# Patient Record
Sex: MALE | Race: WHITE | NOT HISPANIC OR LATINO | Employment: FULL TIME | URBAN - METROPOLITAN AREA
[De-identification: names, ages, dates, MRNs, and addresses within clinical notes are randomized per-mention and may not be internally consistent; named-entity substitution may affect disease eponyms.]

---

## 2022-02-21 PROBLEM — S46.212D BICEPS RUPTURE, DISTAL, LEFT, SUBSEQUENT ENCOUNTER: Status: ACTIVE | Noted: 2022-02-21

## 2022-03-04 ENCOUNTER — TELEPHONE (OUTPATIENT)
Dept: ORTHOPEDIC SURGERY | Facility: CLINIC | Age: 47
End: 2022-03-04

## 2022-03-04 NOTE — TELEPHONE ENCOUNTER
Caller: MARY FORDE    Relationship to patient: WIFE    Best call back number:     Patient is needing: THE PATIENT WILL NEED AN Sami  FOR HIS POST-OP APPOINTMENT ON 3/10/22 AT 1:20

## 2022-06-23 ENCOUNTER — TELEPHONE (OUTPATIENT)
Dept: PHYSICAL THERAPY | Facility: OTHER | Age: 47
End: 2022-06-23

## 2023-08-08 ENCOUNTER — TELEPHONE (OUTPATIENT)
Dept: FAMILY MEDICINE CLINIC | Facility: CLINIC | Age: 48
End: 2023-08-08

## 2023-08-08 ENCOUNTER — TELEPHONE (OUTPATIENT)
Dept: ORTHOPEDIC SURGERY | Facility: CLINIC | Age: 48
End: 2023-08-08

## 2023-08-08 NOTE — TELEPHONE ENCOUNTER
Provider: DR HAND  Caller:  RADIOLOGY    Reason for Call: PATIENTS ANESTHESIA SCAN  WAS CX DUE TO ABNORMAL EKG.PATIENT CAN NOT PROCEED UNTIL THEY GET PROPER CLEARANCE FROM THEIR PRIMARY CARE.     ELADIO CRAFT IS THE NURSE [facility] THAT DONE THE TESTING   HER PHONE NUMBER [phone]    PLEASE ADVISE

## 2023-08-08 NOTE — TELEPHONE ENCOUNTER
ELADIO CALLED ABOUT PT HAVING AN ABNORMAL EKG AND WANTS PATIENT TO BE SEEN. HE'S EKG IS IN EPIC WITH [facility] HE NEEDS CLEARANCE FROM [facility] BEFORE HE CAN HAVE HIS MRI UNDER ANESTHESIA.

## 2023-09-06 ENCOUNTER — HOSPITAL ENCOUNTER (OUTPATIENT)
Dept: CARDIOLOGY | Facility: HOSPITAL | Age: 48
Discharge: HOME | End: 2023-09-06
Admitting: NURSE PRACTITIONER
Payer: MEDICAID

## 2023-09-06 VITALS
SYSTOLIC BLOOD PRESSURE: 140 MMHG | DIASTOLIC BLOOD PRESSURE: 78 MMHG | WEIGHT: 242.51 LBS | HEART RATE: 82 BPM | BODY MASS INDEX: 32.85 KG/M2 | HEIGHT: 72 IN

## 2023-09-06 DIAGNOSIS — I47.29 VENTRICULAR TACHYCARDIA (PAROXYSMAL): ICD-10-CM

## 2023-09-06 DIAGNOSIS — R94.31 ABNORMAL EKG: ICD-10-CM

## 2023-09-06 DIAGNOSIS — I50.21 ACUTE HFREF (HEART FAILURE WITH REDUCED EJECTION FRACTION): ICD-10-CM

## 2023-09-06 LAB
BH CV NUCLEAR PRIOR STUDY: 2
BH CV REST NUCLEAR ISOTOPE DOSE: 9.9 MCI
BH CV STRESS BP STAGE 1: NORMAL
BH CV STRESS BP STAGE 2: NORMAL
BH CV STRESS DURATION MIN STAGE 1: 3
BH CV STRESS DURATION MIN STAGE 2: 3
BH CV STRESS DURATION MIN STAGE 3: 3
BH CV STRESS DURATION MIN STAGE 4: 1
BH CV STRESS DURATION SEC STAGE 1: 0
BH CV STRESS DURATION SEC STAGE 2: 0
BH CV STRESS DURATION SEC STAGE 3: 0
BH CV STRESS DURATION SEC STAGE 4: 0
BH CV STRESS GRADE STAGE 1: 10
BH CV STRESS GRADE STAGE 2: 12
BH CV STRESS GRADE STAGE 3: 14
BH CV STRESS GRADE STAGE 4: 16
BH CV STRESS HR STAGE 1: 139
BH CV STRESS HR STAGE 2: 155
BH CV STRESS HR STAGE 3: 171
BH CV STRESS HR STAGE 4: 184
BH CV STRESS METS STAGE 1: 5
BH CV STRESS METS STAGE 2: 7.5
BH CV STRESS METS STAGE 3: 10
BH CV STRESS METS STAGE 4: 13.5
BH CV STRESS NUCLEAR ISOTOPE DOSE: 32.1 MCI
BH CV STRESS O2 STAGE 1: 98
BH CV STRESS O2 STAGE 2: 99
BH CV STRESS O2 STAGE 3: 98
BH CV STRESS O2 STAGE 4: 98
BH CV STRESS PROTOCOL 1: NORMAL
BH CV STRESS RECOVERY BP: NORMAL MMHG
BH CV STRESS RECOVERY HR: 121 BPM
BH CV STRESS RECOVERY O2: 99 %
BH CV STRESS SPEED STAGE 1: 1.7
BH CV STRESS SPEED STAGE 2: 2.5
BH CV STRESS SPEED STAGE 3: 3.4
BH CV STRESS SPEED STAGE 4: 4.2
BH CV STRESS STAGE 1: 1
BH CV STRESS STAGE 2: 2
BH CV STRESS STAGE 3: 3
BH CV STRESS STAGE 4: 4
LV EF NUC BP: 29 %
MAXIMAL PREDICTED HEART RATE: 173 BPM
PERCENT MAX PREDICTED HR: 106.36 %
STRESS BASELINE BP: NORMAL MMHG
STRESS BASELINE HR: 82 BPM
STRESS O2 SAT REST: 98 %
STRESS PERCENT HR: 125 %
STRESS POST ESTIMATED WORKLOAD: 11.9 METS
STRESS POST EXERCISE DUR MIN: 10 MIN
STRESS POST EXERCISE DUR SEC: 0 SEC
STRESS POST O2 SAT PEAK: 98 %
STRESS POST PEAK BP: NORMAL MMHG
STRESS POST PEAK HR: 184 BPM
STRESS TARGET HR: 147 BPM

## 2023-09-06 PROCEDURE — A9500 TC99M SESTAMIBI: HCPCS | Performed by: NURSE PRACTITIONER

## 2023-09-06 PROCEDURE — 78452 HT MUSCLE IMAGE SPECT MULT: CPT

## 2023-09-06 PROCEDURE — 93017 CV STRESS TEST TRACING ONLY: CPT

## 2023-09-06 PROCEDURE — 0 TECHNETIUM SESTAMIBI: Performed by: NURSE PRACTITIONER

## 2023-09-06 RX ADMIN — TECHNETIUM TC 99M SESTAMIBI 1 DOSE: 1 INJECTION INTRAVENOUS at 14:50

## 2023-09-06 RX ADMIN — TECHNETIUM TC 99M SESTAMIBI 1 DOSE: 1 INJECTION INTRAVENOUS at 12:20

## 2023-09-14 ENCOUNTER — OFFICE VISIT (OUTPATIENT)
Dept: CARDIOLOGY | Facility: CLINIC | Age: 48
End: 2023-09-14
Payer: MEDICAID

## 2023-09-14 VITALS
OXYGEN SATURATION: 99 % | SYSTOLIC BLOOD PRESSURE: 118 MMHG | HEIGHT: 72 IN | HEART RATE: 43 BPM | BODY MASS INDEX: 32.29 KG/M2 | WEIGHT: 238.4 LBS | DIASTOLIC BLOOD PRESSURE: 64 MMHG

## 2023-09-14 DIAGNOSIS — I49.3 PVC (PREMATURE VENTRICULAR CONTRACTION): Primary | Chronic | ICD-10-CM

## 2023-09-14 DIAGNOSIS — I50.22 CHRONIC SYSTOLIC CONGESTIVE HEART FAILURE: ICD-10-CM

## 2023-09-14 DIAGNOSIS — E78.1 HYPERTRIGLYCERIDEMIA: Chronic | ICD-10-CM

## 2023-09-14 DIAGNOSIS — I42.8 OTHER CARDIOMYOPATHY: Chronic | ICD-10-CM

## 2023-09-14 NOTE — PROGRESS NOTES
New Patient     Name: Carlos Pearson    : 1975     MRN: [id number]     DOS: 2023  Referred By: Marion Blunt APRN    Chief Complaint  Ventricular tachycardia (paroxysmal)    Subjective     History of Present Illness:  Carlos Pearson is a 48 y.o. male who presents today for evaluation of cardiomyopathy and PVC/NSVT.    Patient is Malay speaking.  Following was done with an in person .    Patient presents to follow-up on testing.  He currently feels well.  He has no shortness of breath at rest or with exertion.  He has no chest pain.  He has no palpitations.  He has no orthopnea or PND.  He sleeps well throughout the night.  He works doing manual labor and factory with boxes and has no limitations.  He has been prescribed Entresto and metoprolol but has not yet started these due to issues with the pharmacy.  He is taking his fenofibrate as prescribed.  Feels like his weight is stable.  Good appetite.    Is a former smoker, having smoked [place] [facility] but has not smoked since being [place].  No alcohol.  No family history of cardiac disease.      Objective     Past Medical History:   Diagnosis Date    Abnormal ECG     Snoring     Ventricular tachycardia (paroxysmal)      Past Surgical History:   Procedure Laterality Date    BICEPS TENDON REPAIR Left 2022    Procedure: BICEPS TENDON REPAIR LEFT;  Surgeon: Noah Herron MD;  Location: [facility];  Service: Orthopedics;  Laterality: Left;     Family History   Problem Relation Age of Onset    Asthma Father      Social History     Socioeconomic History    Marital status:    Tobacco Use    Smoking status: Never     Passive exposure: Never    Smokeless tobacco: Never   Vaping Use    Vaping Use: Never used   Substance and Sexual Activity    Alcohol use: No    Drug use: No    Sexual activity: Defer     Current Outpatient Medications on File Prior to Visit   Medication Sig Dispense Refill     "acetaminophen (TYLENOL) 500 MG tablet Take 1 tablet by mouth Every 6 (Six) Hours As Needed for Mild Pain.      cyclobenzaprine (FLEXERIL) 10 MG tablet Take 1 tablet by mouth 3 (Three) Times a Day As Needed.      fenofibrate (TRICOR) 145 MG tablet Take 1 tablet by mouth Daily. 30 tablet 5    ibuprofen (ADVIL,MOTRIN) 200 MG tablet Take 1 tablet by mouth Every 6 (Six) Hours As Needed for Mild Pain.      metoprolol succinate XL (TOPROL-XL) 50 MG 24 hr tablet Take 1 tablet by mouth Daily. 30 tablet 3    sacubitril-valsartan (Entresto) 24-26 MG tablet Take 1 tablet by mouth 2 (Two) Times a Day. 60 tablet 3    [DISCONTINUED] diclofenac (VOLTAREN) 75 MG EC tablet Take 1 tablet by mouth 2 (Two) Times a Day. (Patient not taking: Reported on 7/28/2023)      [DISCONTINUED] PEG-KCl-NaCl-NaSulf-Na Asc-C (MoviPrep) 100 g reconstituted solution powder Use as directed by Provider for colonoscopy prep (Patient not taking: Reported on 8/30/2023) 1 each 0     No current facility-administered medications on file prior to visit.         Vital Signs  /64 (BP Location: Right arm, Patient Position: Sitting, Cuff Size: Adult)   Pulse (!) 43 Comment: retook...58  Ht 182.9 cm (72\")   Wt 108 kg (238 lb 6.4 oz)   SpO2 99%   BMI 32.33 kg/m²   Estimated body mass index is 32.33 kg/m² as calculated from the following:    Height as of this encounter: 182.9 cm (72\").    Weight as of this encounter: 108 kg (238 lb 6.4 oz).    Vitals and nursing note reviewed.   Constitutional:       Appearance: Healthy appearance. Not in distress.   Eyes:      Conjunctiva/sclera: Conjunctivae normal.   HENT:    Mouth/Throat:      Dentition: Normal.      Pharynx: Oropharynx is clear.   Neck:      Vascular: JVD normal.   Pulmonary:      Effort: Pulmonary effort is normal.      Breath sounds: Normal breath sounds.   Cardiovascular:      PMI at left midclavicular line. Normal rate. Frequent ectopic beats. Regular rhythm.      Murmurs: There is no murmur.      " No gallop.  No click. No rub.   Pulses:     Intact distal pulses.   Edema:     Peripheral edema absent.   Musculoskeletal: Normal range of motion.      Cervical back: Normal range of motion and neck supple. Skin:     General: Skin is warm and dry.   Neurological:      Mental Status: Alert and oriented to person, place and time.       Results (if applicable):  Lab Results   Component Value Date    CHOL 355 (H) 07/07/2023    CHLPL 257 (H) 06/11/2021    TRIG 2,075 (H) 07/07/2023    HDL 20 (L) 07/07/2023    LDL  07/07/2023      Comment:      Unable to calculate    LDL 29 07/07/2023     Lab Results   Component Value Date    GLUCOSE 102 (H) 08/09/2023    CALCIUM 9.7 08/09/2023     08/09/2023    K 3.9 08/09/2023    CO2 27.2 08/09/2023     08/09/2023    BUN 20 08/09/2023    CREATININE 1.20 08/09/2023    EGFR 75.1 08/09/2023    BCR 16.7 08/09/2023    ANIONGAP 9.8 08/09/2023     Lab Results   Component Value Date    WBC 6.93 07/07/2023    HGB 16.6 07/07/2023    HCT 43.8 07/07/2023    MCV 86.1 07/07/2023     07/07/2023      Lab Results   Component Value Date    HGBA1C 5.40 07/07/2023       Echocardiogram (8/18/2023)      Estimated left ventricular EF = 40%    The left ventricular cavity is borderline dilated.    The cardiac valves are anatomically and functionally normal.     Nuclear treadmill stress (9/6/2023)      Treadmill nuclear stress test for VT and systolic heart failure    The patient exercised a total of 10 minutes and 50 seconds, achieving 11.9 METs    Frequent PVCs noted at rest. These were suppresed during stress and returned in the recovery phase.    No ECG evidence of ischemia    There is apical thinning present but no perfusion deficit noted at rest or with stress    Left ventricular ejection fraction is severely reduced (Calculated EF = 29%).    There is no prior study available for comparison.    No CT evidence of coronary or aortic calcifications.      Assessment and Plan     Carlos kaur  Michel is a 48 y.o. male who presents today for the aforementioned problems.     Diagnoses and all orders for this visit:    1. PVC (premature ventricular contraction) (Primary)    2. Chronic systolic congestive heart failure    3. Hypertriglyceridemia        This is a pleasant 48-year-old gentleman who presents today with an .  Patient was found to have bradycardia incidentally during a visit to  for an MRI.  He then presented to our heart valve clinic where he was found to have frequent PVCs.  Subsequent echocardiogram showed an ejection fraction of 40%, he had a nuclear stress treadmill which failed to show any perfusion deficits but did show frequent PVCs that were suppressed with exertion.  He has been prescribed metoprolol succinate 50 mg daily as well as Entresto 24/26 was not started these due to issues with the pharmacy.  He will go from our clinic to the pharmacy to pick these up.  On exam, he is euvolemic and has no symptoms of heart failure currently.  He has no functional limitations from heart failure.  After starting his medications, we will obtain an echocardiogram in 3 months.  If no improvement in his EF, or if there is worsening, plan to refer him to EP for consideration of a PVC ablation.  We are also awaiting the results of his monitor. He has a profound hypertriglyceridemia for which he was recently started on fenofibrate, this will need to be monitored closely.     Summary  Medication changes: None (hasn't yet started metoprolol or Entresto)  Diagnostic studies ordered: Echocardiogram in 3 months  RTC 3 months    Pavel Baez MD  Interventional Cardiology  [facility]

## 2023-09-14 NOTE — PATIENT INSTRUCTIONS
It was a pleasure seeing you in clinic today.     Medication changes: Start the metoprolol and Entresto    Blood work ordered: None    Diagnostic studies ordered: Repeat ultrasound of heart in 3 months

## 2023-12-11 RX ORDER — METOPROLOL SUCCINATE 50 MG/1
50 TABLET, EXTENDED RELEASE ORAL DAILY
Qty: 90 TABLET | Refills: 3 | Status: SHIPPED | OUTPATIENT
Start: 2023-12-11

## 2023-12-11 RX ORDER — METOPROLOL SUCCINATE 50 MG/1
50 TABLET, EXTENDED RELEASE ORAL DAILY
Qty: 90 TABLET | Refills: 1 | OUTPATIENT
Start: 2023-12-11

## 2023-12-14 ENCOUNTER — HOSPITAL ENCOUNTER (OUTPATIENT)
Dept: CARDIOLOGY | Facility: HOSPITAL | Age: 48
Discharge: HOME | End: 2023-12-14
Payer: MEDICAID

## 2023-12-14 VITALS — BODY MASS INDEX: 32.23 KG/M2 | HEIGHT: 72 IN | WEIGHT: 238 LBS

## 2023-12-14 DIAGNOSIS — I42.8 OTHER CARDIOMYOPATHY: Chronic | ICD-10-CM

## 2023-12-14 LAB
BH CV ECHO MEAS - AO MAX PG: 8.8 MMHG
BH CV ECHO MEAS - AO MEAN PG: 5 MMHG
BH CV ECHO MEAS - AO ROOT DIAM: 3.5 CM
BH CV ECHO MEAS - AO V2 MAX: 148 CM/SEC
BH CV ECHO MEAS - AO V2 VTI: 26.3 CM
BH CV ECHO MEAS - AVA(I,D): 2.11 CM2
BH CV ECHO MEAS - EDV(CUBED): 202.2 ML
BH CV ECHO MEAS - EDV(MOD-SP2): 151 ML
BH CV ECHO MEAS - EDV(MOD-SP4): 218 ML
BH CV ECHO MEAS - EF(MOD-BP): 41 %
BH CV ECHO MEAS - EF(MOD-SP2): 35.1 %
BH CV ECHO MEAS - EF(MOD-SP4): 43.1 %
BH CV ECHO MEAS - ESV(MOD-SP2): 98 ML
BH CV ECHO MEAS - ESV(MOD-SP4): 124 ML
BH CV ECHO MEAS - IVS/LVPW: 0.97 CM
BH CV ECHO MEAS - IVSD: 1 CM
BH CV ECHO MEAS - LA DIMENSION: 4 CM
BH CV ECHO MEAS - LAT PEAK E' VEL: 17.3 CM/SEC
BH CV ECHO MEAS - LV DIASTOLIC VOL/BSA (35-75): 95 CM2
BH CV ECHO MEAS - LV MASS(C)D: 241.9 GRAMS
BH CV ECHO MEAS - LV MAX PG: 3.1 MMHG
BH CV ECHO MEAS - LV MEAN PG: 2 MMHG
BH CV ECHO MEAS - LV SYSTOLIC VOL/BSA (12-30): 54.1 CM2
BH CV ECHO MEAS - LV V1 MAX: 88.7 CM/SEC
BH CV ECHO MEAS - LV V1 VTI: 16.1 CM
BH CV ECHO MEAS - LVIDD: 5.9 CM
BH CV ECHO MEAS - LVIDS: 3.9 CM
BH CV ECHO MEAS - LVOT AREA: 3.5 CM2
BH CV ECHO MEAS - LVOT DIAM: 2.1 CM
BH CV ECHO MEAS - LVPWD: 1.03 CM
BH CV ECHO MEAS - MED PEAK E' VEL: 9.9 CM/SEC
BH CV ECHO MEAS - MV A MAX VEL: 84 CM/SEC
BH CV ECHO MEAS - MV DEC SLOPE: 595.5 CM/SEC2
BH CV ECHO MEAS - MV DEC TIME: 0.14 SEC
BH CV ECHO MEAS - MV E MAX VEL: 58.7 CM/SEC
BH CV ECHO MEAS - MV E/A: 0.7
BH CV ECHO MEAS - MV MAX PG: 2.5 MMHG
BH CV ECHO MEAS - MV MEAN PG: 1.63 MMHG
BH CV ECHO MEAS - MV P1/2T: 31.8 MSEC
BH CV ECHO MEAS - MV V2 VTI: 17.2 CM
BH CV ECHO MEAS - MVA(P1/2T): 6.9 CM2
BH CV ECHO MEAS - MVA(VTI): 3.2 CM2
BH CV ECHO MEAS - PA ACC TIME: 0.15 SEC
BH CV ECHO MEAS - RAP SYSTOLE: 3 MMHG
BH CV ECHO MEAS - RVSP: 24 MMHG
BH CV ECHO MEAS - SI(MOD-SP2): 23.1 ML/M2
BH CV ECHO MEAS - SI(MOD-SP4): 41 ML/M2
BH CV ECHO MEAS - SV(LVOT): 55.6 ML
BH CV ECHO MEAS - SV(MOD-SP2): 53 ML
BH CV ECHO MEAS - SV(MOD-SP4): 94 ML
BH CV ECHO MEAS - TAPSE (>1.6): 2.04 CM
BH CV ECHO MEAS - TR MAX PG: 20.6 MMHG
BH CV ECHO MEAS - TR MAX VEL: 226.6 CM/SEC
BH CV ECHO MEASUREMENTS AVERAGE E/E' RATIO: 4.32
BH CV VAS BP RIGHT ARM: NORMAL MMHG
BH CV XLRA - RV BASE: 4.7 CM
BH CV XLRA - RV LENGTH: 9.2 CM
BH CV XLRA - RV MID: 3.7 CM
BH CV XLRA - TDI S': 17 CM/SEC
LEFT ATRIUM VOLUME INDEX: 29.4 ML/M2

## 2023-12-14 PROCEDURE — 93306 TTE W/DOPPLER COMPLETE: CPT

## 2024-01-11 ENCOUNTER — OFFICE VISIT (OUTPATIENT)
Dept: CARDIOLOGY | Facility: CLINIC | Age: 49
End: 2024-01-11
Payer: MEDICAID

## 2024-01-11 VITALS
SYSTOLIC BLOOD PRESSURE: 120 MMHG | WEIGHT: 239.8 LBS | HEIGHT: 72 IN | HEART RATE: 96 BPM | DIASTOLIC BLOOD PRESSURE: 60 MMHG | BODY MASS INDEX: 32.48 KG/M2 | OXYGEN SATURATION: 95 %

## 2024-01-11 DIAGNOSIS — E78.1 HYPERTRIGLYCERIDEMIA: ICD-10-CM

## 2024-01-11 DIAGNOSIS — I49.3 PVC (PREMATURE VENTRICULAR CONTRACTION): Primary | Chronic | ICD-10-CM

## 2024-01-11 DIAGNOSIS — I50.22 CHRONIC SYSTOLIC CONGESTIVE HEART FAILURE: Chronic | ICD-10-CM

## 2024-01-11 RX ORDER — METOPROLOL SUCCINATE 50 MG/1
50 TABLET, EXTENDED RELEASE ORAL DAILY
Qty: 90 TABLET | Refills: 3 | Status: SHIPPED | OUTPATIENT
Start: 2024-01-11

## 2024-01-11 RX ORDER — SACUBITRIL AND VALSARTAN 24; 26 MG/1; MG/1
1 TABLET, FILM COATED ORAL 2 TIMES DAILY
Qty: 120 TABLET | Refills: 3 | Status: SHIPPED | OUTPATIENT
Start: 2024-01-11

## 2024-01-11 NOTE — PROGRESS NOTES
New Patient     Name: Frankie Alatorre Ra    : 1975     MRN: [id number]     DOS: 2024  Referred By: No ref. provider found    Chief Complaint  PVC (premature ventricular contraction)    Subjective     History of Present Illness:  Frankie Alatorre Ra is a 48 y.o. male with history of frequent PVCs and presumed nonischemic cardiomyopathy who presents in routine follow-up.  The following is obtained through an Yoruba .    Since last being seen, he has continued to do well.  He remains asymptomatic from a PVC standpoint.  He also denies any chest pain, shortness of breath at rest or with exertion, orthopnea, PND, changes in appetite or changes in weight.  He continues to exercise regularly.  He is adherent to his Entresto and his metoprolol.    Objective     Past Medical History:   Diagnosis Date    Abnormal ECG     Snoring     Ventricular tachycardia (paroxysmal)      Past Surgical History:   Procedure Laterality Date    BICEPS TENDON REPAIR Left 2022    Procedure: BICEPS TENDON REPAIR LEFT;  Surgeon: Noah Herron MD;  [facility];  Service: Orthopedics;  Laterality: Left;     Family History   Problem Relation Age of Onset    Asthma Father      Social History     Socioeconomic History    Marital status:    Tobacco Use    Smoking status: Never     Passive exposure: Never    Smokeless tobacco: Never   Vaping Use    Vaping Use: Never used   Substance and Sexual Activity    Alcohol use: No    Drug use: No    Sexual activity: Defer     Current Outpatient Medications on File Prior to Visit   Medication Sig Dispense Refill    acetaminophen (TYLENOL) 500 MG tablet Take 1 tablet by mouth Every 6 (Six) Hours As Needed for Mild Pain.      cyclobenzaprine (FLEXERIL) 10 MG tablet Take 1 tablet by mouth 3 (Three) Times a Day As Needed.      fenofibrate (TRICOR) 145 MG tablet Take 1 tablet by mouth Daily. 30 tablet 5    [DISCONTINUED] sacubitril-valsartan  "(Entresto) 24-26 MG tablet Take 1 tablet by mouth 2 (Two) Times a Day. 60 tablet 3    [DISCONTINUED] ibuprofen (ADVIL,MOTRIN) 200 MG tablet Take 1 tablet by mouth Every 6 (Six) Hours As Needed for Mild Pain. (Patient not taking: Reported on 1/11/2024)      [DISCONTINUED] metoprolol succinate XL (TOPROL-XL) 50 MG 24 hr tablet Take 1 tablet by mouth Daily. (Patient not taking: Reported on 1/11/2024) 90 tablet 3     No current facility-administered medications on file prior to visit.         Vital Signs  /60 (BP Location: Right arm, Patient Position: Sitting, Cuff Size: Adult)   Pulse 96   Ht 182.9 cm (72\")   Wt 109 kg (239 lb 12.8 oz)   SpO2 95%   BMI 32.52 kg/m²   Estimated body mass index is 32.52 kg/m² as calculated from the following:    Height as of this encounter: 182.9 cm (72\").    Weight as of this encounter: 109 kg (239 lb 12.8 oz).    Vitals and nursing note reviewed.   Constitutional:       Appearance: Healthy appearance. Not in distress.   Eyes:      Conjunctiva/sclera: Conjunctivae normal.   HENT:    Mouth/Throat:      Dentition: Normal.      Pharynx: Oropharynx is clear.   Neck:      Vascular: JVD normal.   Pulmonary:      Effort: Pulmonary effort is normal.      Breath sounds: Normal breath sounds.   Cardiovascular:      PMI at left midclavicular line. Normal rate. Frequent ectopic beats. Regular rhythm.      Murmurs: There is no murmur.      No gallop.  No click. No rub.   Pulses:     Intact distal pulses.   Edema:     Peripheral edema absent.   Musculoskeletal: Normal range of motion.      Cervical back: Normal range of motion and neck supple. Skin:     General: Skin is warm and dry.   Neurological:      Mental Status: Alert and oriented to person, place and time.         Results (if applicable):  Lab Results   Component Value Date    CHOL 355 (H) 07/07/2023    CHLPL 257 (H) 06/11/2021    TRIG 2,075 (H) 07/07/2023    HDL 20 (L) 07/07/2023    LDL  07/07/2023      Comment:      Unable to " calculate    LDL 29 07/07/2023     Lab Results   Component Value Date    GLUCOSE 102 (H) 08/09/2023    CALCIUM 9.7 08/09/2023     08/09/2023    K 3.9 08/09/2023    CO2 27.2 08/09/2023     08/09/2023    BUN 20 08/09/2023    CREATININE 1.20 08/09/2023    EGFR 75.1 08/09/2023    BCR 16.7 08/09/2023    ANIONGAP 9.8 08/09/2023     Lab Results   Component Value Date    WBC 6.93 07/07/2023    HGB 16.6 07/07/2023    HCT 43.8 07/07/2023    MCV 86.1 07/07/2023     07/07/2023      Lab Results   Component Value Date    HGBA1C 5.40 07/07/2023       Echocardiogram (8/18/2023)    Estimated left ventricular EF = 40%    The left ventricular cavity is borderline dilated.    The cardiac valves are anatomically and functionally normal.     Nuclear treadmill stress (9/6/2023)       Treadmill nuclear stress test for VT and systolic heart failure    The patient exercised a total of 10 minutes and 50 seconds, achieving 11.9 METs    Frequent PVCs noted at rest. These were suppresed during stress and returned in the recovery phase.    No ECG evidence of ischemia    There is apical thinning present but no perfusion deficit noted at rest or with stress    Left ventricular ejection fraction is severely reduced (Calculated EF = 29%).    There is no prior study available for comparison.    No CT evidence of coronary or aortic calcifications.    Echocardiogram (12/14/2023)    Left ventricular systolic function is mildly decreased. Calculated left ventricular EF of 41% with global hypokinesis    Left ventricular diastolic function is consistent with (grade Ia w/high LAP) impaired relaxation.    The right ventricle is mildly dilated with normal systolic function    No significant valvular abnormalities    No pericardial effusion    Compared to prior echocardiogram dated 8/18/2023, there is no significant change.       Procedures    Assessment and Plan     Frankie Cidmaria r Bills Celi Pang is a 48 y.o. male who presents today for the  aforementioned problems.     Diagnoses and all orders for this visit:    1. PVC (premature ventricular contraction) (Primary)  -     metoprolol succinate XL (TOPROL-XL) 50 MG 24 hr tablet; Take 1 tablet by mouth Daily.  Dispense: 90 tablet; Refill: 3  -     Ambulatory Referral to Cardiac Electrophysiology    2. Chronic systolic congestive heart failure  -     sacubitril-valsartan (Entresto) 24-26 MG tablet; Take 1 tablet by mouth 2 (Two) Times a Day.  Dispense: 120 tablet; Refill: 3  -     metoprolol succinate XL (TOPROL-XL) 50 MG 24 hr tablet; Take 1 tablet by mouth Daily.  Dispense: 90 tablet; Refill: 3  -     Ambulatory Referral to Cardiac Electrophysiology    3. Hypertriglyceridemia        This is a pleasant 48-year-old Turkmen speaking gentleman who presents today in routine follow-up.  He is currently doing well, he is free of heart failure or angina related symptoms.  He is also free of any PVC related symptoms.  However, despite GDMT with metoprolol and Entresto his EF did not improve.  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  In the interim, he will remain on metoprolol succinate 50 mg daily as well as Entresto 24/26 twice daily.  His blood pressure today in clinic is at goal.  He appears euvolemic on exam.  As before, he is taking a fibrate for his profound hypertriglyceridemia and this will need to be monitored closely.  He may benefit from Vascepa in the future.      Summary  Medication changes: None, refill of metoprolol and Entresto  Diagnostic studies ordered: Referral to EP  RTC 6 months or sooner as needed    Pavel Baez MD, [facility]  Interventional Cardiology  [facility]

## 2024-01-22 ENCOUNTER — OFFICE VISIT (OUTPATIENT)
Dept: CARDIOLOGY | Facility: CLINIC | Age: 49
End: 2024-01-22
Payer: MEDICAID

## 2024-01-22 VITALS
BODY MASS INDEX: 33.08 KG/M2 | HEIGHT: 72 IN | WEIGHT: 244.2 LBS | HEART RATE: 86 BPM | DIASTOLIC BLOOD PRESSURE: 80 MMHG | SYSTOLIC BLOOD PRESSURE: 136 MMHG | OXYGEN SATURATION: 96 %

## 2024-01-22 DIAGNOSIS — R06.83 SNORING: ICD-10-CM

## 2024-01-22 DIAGNOSIS — I49.3 PVC (PREMATURE VENTRICULAR CONTRACTION): ICD-10-CM

## 2024-01-22 DIAGNOSIS — I50.22 CHRONIC SYSTOLIC CONGESTIVE HEART FAILURE: Primary | ICD-10-CM

## 2024-01-22 DIAGNOSIS — I49.3 PVC (PREMATURE VENTRICULAR CONTRACTION): Primary | ICD-10-CM

## 2024-01-22 PROCEDURE — 99214 OFFICE O/P EST MOD 30 MIN: CPT | Performed by: INTERNAL MEDICINE

## 2024-01-22 RX ORDER — VERAPAMIL HYDROCHLORIDE 240 MG/1
240 TABLET, FILM COATED, EXTENDED RELEASE ORAL DAILY
Qty: 90 TABLET | Refills: 3 | Status: SHIPPED | OUTPATIENT
Start: 2024-01-22 | End: 2025-01-21

## 2024-01-22 NOTE — PROGRESS NOTES
Cardiac Electrophysiology Outpatient Consult Note            [facility]    Consult Note     Frankie Alatorre Ra  [id number]  01/22/2024  [phone]     Primary Care Physician: Darlin Wells PA-C    Referred By: Pavel Baez III, MD    Subjective     Chief Complaint:   Diagnoses and all orders for this visit:    1. Chronic systolic congestive heart failure (Primary)    2. PVC (premature ventricular contraction)    3. Snoring    Other orders  -     verapamil SR (CALAN-SR) 240 MG CR tablet; Take 1 tablet by mouth Daily.  Dispense: 90 tablet; Refill: 3      Chief Complaint   Patient presents with    PVC (premature ventricular contraction)     NP       History of Present Illness:   Frankie Alatorre Ra is a 48 y.o. male who presents to my electrophysiology clinic for evaluation of above complaints.  This patient communicates via .    Patient was scheduled to have an MRI of his arm.  MRI was performed with a fair bit of anxiety and fear from the patient he is quite claustrophobic.  He is will not be able to get another MRI he states.    The PVCs were documented at the time of the MRI.  This is the first time he heard about this.  He is referred to me for consideration about what to do.    No palpitations.  \  No awareness of the PVCs    No family history of sudden death     no syncope    No presyncope      Past Medical History:   Past Medical History:   Diagnosis Date    Abnormal ECG     Snoring     Ventricular tachycardia (paroxysmal)        Past Surgical History:   Past Surgical History:   Procedure Laterality Date    BICEPS TENDON REPAIR Left 02/23/2022    Procedure: BICEPS TENDON REPAIR LEFT;  Surgeon: Noah Herron MD;  Location: [facility];  Service: Orthopedics;  Laterality: Left;       Family History:   Family History   Problem Relation Age of Onset    Asthma Father        Social History:   Social History  "    Socioeconomic History    Marital status:    Tobacco Use    Smoking status: Never     Passive exposure: Never    Smokeless tobacco: Never   Vaping Use    Vaping Use: Never used   Substance and Sexual Activity    Alcohol use: No    Drug use: No    Sexual activity: Yes     Partners: Female       Medications:     Current Outpatient Medications:     acetaminophen (TYLENOL) 500 MG tablet, Take 1 tablet by mouth Every 6 (Six) Hours As Needed for Mild Pain., Disp: , Rfl:     cyclobenzaprine (FLEXERIL) 10 MG tablet, Take 1 tablet by mouth 3 (Three) Times a Day As Needed., Disp: , Rfl:     fenofibrate (TRICOR) 145 MG tablet, Take 1 tablet by mouth Daily., Disp: 30 tablet, Rfl: 5    sacubitril-valsartan (Entresto) 24-26 MG tablet, Take 1 tablet by mouth 2 (Two) Times a Day., Disp: 120 tablet, Rfl: 3    verapamil SR (CALAN-SR) 240 MG CR tablet, Take 1 tablet by mouth Daily., Disp: 90 tablet, Rfl: 3    Allergies:   No Known Allergies    Objective   Vital Signs:   Vitals:    01/22/24 1611   BP: 136/80   BP Location: Left arm   Patient Position: Sitting   Cuff Size: Adult   Pulse: 86   SpO2: 96%   Weight: 111 kg (244 lb 3.2 oz)   Height: 182.9 cm (72\")       PHYSICAL EXAM  General appearance: Awake, alert, cooperative  Head: Normocephalic, without obvious abnormality, atraumatic  Eyes: Conjunctivae/corneas clear, EOMs intact  Neck: no adenopathy, no carotid bruit, no JVD, and thyroid: not enlarged  Lungs: clear to auscultation bilaterally and no rhonchi or crackles\", ' symmetric  Heart: regular rate and rhythm, S1, S2 normal, no murmur, click, rub or gallop  Abdomen: Soft, non-tender, bowel sounds normal,  no organomegaly  Extremities: extremities normal, atraumatic, no cyanosis or edema  Skin: Skin color, turgor normal, no rashes or lesions  Neurologic: Grossly normal     Lab Results   Component Value Date    GLUCOSE 102 (H) 08/09/2023    CALCIUM 9.7 08/09/2023     08/09/2023    K 3.9 08/09/2023    CO2 27.2 " "08/09/2023     08/09/2023    BUN 20 08/09/2023    CREATININE 1.20 08/09/2023    EGFRIFAFRI >150 06/11/2021    EGFRIFNONA >150 06/11/2021    BCR 16.7 08/09/2023    ANIONGAP 9.8 08/09/2023     Lab Results   Component Value Date    WBC 6.93 07/07/2023    HGB 16.6 07/07/2023    HCT 43.8 07/07/2023    MCV 86.1 07/07/2023     07/07/2023     No results found for: \"INR\", \"PROTIME\"  Lab Results   Component Value Date    TSH 3.930 07/07/2023       Cardiac Testing:      I personally viewed and interpreted the patient's EKG/Telemetry/lab data    Procedures    Tobacco Cessation: N/A  Obstructive Sleep Apnea Screening: Completed    Advance Care Planning   ACP discussion was declined by the patient. Patient does not have an advance directive, declines further assistance.       Assessment & Plan    Diagnoses and all orders for this visit:    1. Chronic systolic congestive heart failure (Primary)    2. PVC (premature ventricular contraction)    3. Snoring    Other orders  -     verapamil SR (CALAN-SR) 240 MG CR tablet; Take 1 tablet by mouth Daily.  Dispense: 90 tablet; Refill: 3         Diagnosis Plan   1. Chronic systolic congestive heart failure  Consider primary cardiomyopathy versus PVC induced cardiomyopathy.  See discussion below.      2. PVC (premature ventricular contraction)  Patient is referred by his cardiologist for consideration of a PVC induced cardiomyopathy versus a primary cardiomyopathy.    Patient had his PVC discovered by accident.  His LV systolic function is modestly reduced at ejection fraction of 39% x 1 study and slightly less by another study.  Patient has subtle abnormality of depolarization noted in the inferior myocardial leads on his surface EKG.    His PVC is of an outflow tract morphology that is to say left bundle branch block morphology with transition in lead V3 inferior axis RS complex in lead I.     His PVC burden is 33% x 1 estimate.    He has no awareness and no symptoms of his " PVCs.    His noninvasive stress test was unremarkable except for LV systolic dysfunction.    Review personally of his echocardiogram demonstrates perhaps mild LV dilation versus normal variant but clearly modest degree of LV systolic dysfunction.    First and foremost we need to make a decision whether this patient has a PVC induced cardiomyopathy versus potentially a primary cardiomyopathy was a resultant PVC.    All of this conversation was communicated with the patient via .    We will stop his Toprol    Place him on verapamil to 40 once a day to see if we can suppress the PVC.  If we are able to suppress the PVC as reviewed by a 24-hour Holter monitor to less than 10% then this will be helpful.  If his ejection fraction substantially improves we will admit a diagnosis of a PVC cardiomyopathy if the however the ejection fraction remains significantly depressed and then likely he has a primary cardiomyopathy.    He remains to be seen at this juncture whether the patient will stand to benefit from a catheter ablation procedure however certainly this is possible.    All of this was communicated via the .  We spent about 50 minutes in communication altogether.  Everyone's questions were answered to their satisfaction.         Body mass index is 33.12 kg/m².    I spent 52 minutes in consultation with this patient which included more than 65% of this time in direct face-to-face counseling, physical examination and discussion of my assessment and findings and this shared decision making with the patient.  The remainder of the time not spent face-to-face was performing one, some or all of the following actions: preparing to see the patient (e.g. reviewing tests, prior clinicians' notes, etc), ordering medications, tests or procedures, coordination of care, discussion of the plan with other healthcare providers, documenting clinical information in epic as well as independently interpreting  results and communication of these results to the patient family and/or caregiver(s).  Please note that this explicitly excludes time spent on other separate billable services such as performing procedures or test interpretation, when applicable.    Follow Up:       Thank you for allowing me to participate in the care of your patient. Please to not hesitate to contact me with additional questions or concerns.      John Romo, DO, FACC, RS  Cardiac Electrophysiologist  [facility] / [facility]

## 2024-01-27 DIAGNOSIS — E78.2 MIXED HYPERLIPIDEMIA: ICD-10-CM

## 2024-01-27 RX ORDER — FENOFIBRATE 145 MG/1
145 TABLET, COATED ORAL DAILY
Qty: 90 TABLET | Refills: 1 | Status: SHIPPED | OUTPATIENT
Start: 2024-01-27

## 2024-02-07 ENCOUNTER — LAB (OUTPATIENT)
Dept: LAB | Facility: HOSPITAL | Age: 49
End: 2024-02-07
Payer: MEDICAID

## 2024-02-07 ENCOUNTER — OFFICE VISIT (OUTPATIENT)
Dept: FAMILY MEDICINE CLINIC | Facility: CLINIC | Age: 49
End: 2024-02-07
Payer: MEDICAID

## 2024-02-07 VITALS
BODY MASS INDEX: 32.18 KG/M2 | DIASTOLIC BLOOD PRESSURE: 58 MMHG | HEART RATE: 56 BPM | SYSTOLIC BLOOD PRESSURE: 124 MMHG | WEIGHT: 237.6 LBS | TEMPERATURE: 97.6 F | OXYGEN SATURATION: 98 % | HEIGHT: 72 IN

## 2024-02-07 DIAGNOSIS — E78.2 MIXED HYPERLIPIDEMIA: ICD-10-CM

## 2024-02-07 DIAGNOSIS — Z00.00 ENCOUNTER FOR PREVENTATIVE ADULT HEALTH CARE EXAMINATION: Primary | ICD-10-CM

## 2024-02-07 DIAGNOSIS — Z12.11 SCREEN FOR COLON CANCER: ICD-10-CM

## 2024-02-07 DIAGNOSIS — Z00.00 ENCOUNTER FOR PREVENTATIVE ADULT HEALTH CARE EXAMINATION: ICD-10-CM

## 2024-02-07 DIAGNOSIS — E66.9 OBESITY (BMI 30.0-34.9): ICD-10-CM

## 2024-02-07 LAB
ABO GROUP BLD: NORMAL
ALBUMIN SERPL-MCNC: 5.1 G/DL (ref 3.5–5.2)
ALBUMIN/GLOB SERPL: 2.3 G/DL
ALP SERPL-CCNC: 47 U/L (ref 39–117)
ALT SERPL W P-5'-P-CCNC: 58 U/L (ref 1–41)
ANION GAP SERPL CALCULATED.3IONS-SCNC: 12 MMOL/L (ref 5–15)
AST SERPL-CCNC: 36 U/L (ref 1–40)
BILIRUB SERPL-MCNC: 0.5 MG/DL (ref 0–1.2)
BUN SERPL-MCNC: 15 MG/DL (ref 6–20)
BUN/CREAT SERPL: 16 (ref 7–25)
CALCIUM SPEC-SCNC: 9.5 MG/DL (ref 8.6–10.5)
CHLORIDE SERPL-SCNC: 104 MMOL/L (ref 98–107)
CHOLEST SERPL-MCNC: 241 MG/DL (ref 0–200)
CO2 SERPL-SCNC: 24 MMOL/L (ref 22–29)
CREAT SERPL-MCNC: 0.94 MG/DL (ref 0.76–1.27)
DEPRECATED RDW RBC AUTO: 44.2 FL (ref 37–54)
EGFRCR SERPLBLD CKD-EPI 2021: 100.6 ML/MIN/1.73
ERYTHROCYTE [DISTWIDTH] IN BLOOD BY AUTOMATED COUNT: 13.1 % (ref 12.3–15.4)
GLOBULIN UR ELPH-MCNC: 2.2 GM/DL
GLUCOSE SERPL-MCNC: 83 MG/DL (ref 65–99)
HCT VFR BLD AUTO: 43.6 % (ref 37.5–51)
HDLC SERPL-MCNC: 31 MG/DL (ref 40–60)
HGB BLD-MCNC: 15.2 G/DL (ref 13–17.7)
LDLC SERPL CALC-MCNC: 101 MG/DL (ref 0–100)
LDLC/HDLC SERPL: 2.64 {RATIO}
MCH RBC QN AUTO: 31.6 PG (ref 26.6–33)
MCHC RBC AUTO-ENTMCNC: 34.9 G/DL (ref 31.5–35.7)
MCV RBC AUTO: 90.6 FL (ref 79–97)
PLATELET # BLD AUTO: 322 10*3/MM3 (ref 140–450)
PMV BLD AUTO: 9.4 FL (ref 6–12)
POTASSIUM SERPL-SCNC: 4.2 MMOL/L (ref 3.5–5.2)
PROT SERPL-MCNC: 7.3 G/DL (ref 6–8.5)
RBC # BLD AUTO: 4.81 10*6/MM3 (ref 4.14–5.8)
RH BLD: POSITIVE
SODIUM SERPL-SCNC: 140 MMOL/L (ref 136–145)
TRIGL SERPL-MCNC: 641 MG/DL (ref 0–150)
VLDLC SERPL-MCNC: 109 MG/DL (ref 5–40)
WBC NRBC COR # BLD AUTO: 8.81 10*3/MM3 (ref 3.4–10.8)

## 2024-02-07 PROCEDURE — 1160F RVW MEDS BY RX/DR IN RCRD: CPT | Performed by: PHYSICIAN ASSISTANT

## 2024-02-07 PROCEDURE — 80061 LIPID PANEL: CPT

## 2024-02-07 PROCEDURE — 36415 COLL VENOUS BLD VENIPUNCTURE: CPT

## 2024-02-07 PROCEDURE — 1159F MED LIST DOCD IN RCRD: CPT | Performed by: PHYSICIAN ASSISTANT

## 2024-02-07 PROCEDURE — 80050 GENERAL HEALTH PANEL: CPT

## 2024-02-07 PROCEDURE — 86901 BLOOD TYPING SEROLOGIC RH(D): CPT

## 2024-02-07 PROCEDURE — 86900 BLOOD TYPING SEROLOGIC ABO: CPT

## 2024-02-07 PROCEDURE — 99396 PREV VISIT EST AGE 40-64: CPT | Performed by: PHYSICIAN ASSISTANT

## 2024-02-08 LAB — TSH SERPL DL<=0.05 MIU/L-ACNC: 1.47 UIU/ML (ref 0.27–4.2)

## 2024-02-16 ENCOUNTER — HOSPITAL ENCOUNTER (OUTPATIENT)
Dept: CARDIOLOGY | Facility: HOSPITAL | Age: 49
Discharge: HOME | End: 2024-02-16
Payer: MEDICAID

## 2024-02-16 DIAGNOSIS — I50.41 CHF (CONGESTIVE HEART FAILURE), NYHA CLASS I, ACUTE, COMBINED: ICD-10-CM

## 2024-03-04 ENCOUNTER — OFFICE VISIT (OUTPATIENT)
Dept: CARDIOLOGY | Facility: CLINIC | Age: 49
End: 2024-03-04
Payer: MEDICAID

## 2024-03-04 VITALS
WEIGHT: 236 LBS | DIASTOLIC BLOOD PRESSURE: 58 MMHG | HEART RATE: 76 BPM | SYSTOLIC BLOOD PRESSURE: 128 MMHG | OXYGEN SATURATION: 94 % | HEIGHT: 72 IN | BODY MASS INDEX: 31.97 KG/M2

## 2024-03-04 DIAGNOSIS — I50.22 CHRONIC SYSTOLIC CONGESTIVE HEART FAILURE: ICD-10-CM

## 2024-03-04 DIAGNOSIS — I49.3 PVC (PREMATURE VENTRICULAR CONTRACTION): Primary | ICD-10-CM

## 2024-03-04 PROBLEM — S46.212D BICEPS RUPTURE, DISTAL, LEFT, SUBSEQUENT ENCOUNTER: Status: RESOLVED | Noted: 2022-02-21 | Resolved: 2024-03-04

## 2024-03-04 PROCEDURE — 99214 OFFICE O/P EST MOD 30 MIN: CPT | Performed by: INTERNAL MEDICINE

## 2024-03-04 RX ORDER — VERAPAMIL HYDROCHLORIDE 240 MG/1
240 TABLET, FILM COATED, EXTENDED RELEASE ORAL 2 TIMES DAILY
Qty: 60 TABLET | Refills: 2 | Status: SHIPPED | OUTPATIENT
Start: 2024-03-04 | End: 2024-06-02

## 2024-03-04 NOTE — PROGRESS NOTES
Cardiac Electrophysiology Outpatient Follow Up Note            [facility]    Follow Up Office Visit      Frankie Alatorre Ra  [id number]  03/04/2024  [unfilled]  [unfilled]    Primary Care Physician: Darlin Wells PA-C    Referred By: No ref. provider found    Subjective     Chief Complaint:   Diagnoses and all orders for this visit:    1. PVC (premature ventricular contraction) (Primary)    2. Chronic systolic congestive heart failure      Chief Complaint   Patient presents with    Chronic systolic congestive heart failure    PVC (premature ventricular contraction)       History of Present Illness:   Frankie Alatorre Ra is a 48 y.o. male who presents to my electrophysiology clinic for follow up of above complaints.  Feels great.  No awareness of PVCs overall doing well..      Past Medical History:   Past Medical History:   Diagnosis Date    Abnormal ECG     Snoring     Ventricular tachycardia (paroxysmal)        Past Surgical History:   Past Surgical History:   Procedure Laterality Date    BICEPS TENDON REPAIR Left 02/23/2022    Procedure: BICEPS TENDON REPAIR LEFT;  Surgeon: Noah Herron MD;  Location: [facility];  Service: Orthopedics;  Laterality: Left;       Family History:   Family History   Problem Relation Age of Onset    Asthma Father        Social History:   Social History     Socioeconomic History    Marital status:    Tobacco Use    Smoking status: Never     Passive exposure: Never    Smokeless tobacco: Never   Vaping Use    Vaping status: Never Used   Substance and Sexual Activity    Alcohol use: No    Drug use: No    Sexual activity: Yes     Partners: Female       Medications:     Current Outpatient Medications:     acetaminophen (TYLENOL) 500 MG tablet, Take 1 tablet by mouth Every 6 (Six) Hours As Needed for Mild Pain., Disp: , Rfl:     fenofibrate (TRICOR) 145 MG tablet, TAKE 1 TABLET BY MOUTH EVERY  "DAY, Disp: 90 tablet, Rfl: 1    sacubitril-valsartan (Entresto) 24-26 MG tablet, Take 1 tablet by mouth 2 (Two) Times a Day. (Patient taking differently: Take 1 tablet by mouth Daily.), Disp: 120 tablet, Rfl: 3    verapamil SR (CALAN-SR) 240 MG CR tablet, Take 1 tablet by mouth Daily., Disp: 90 tablet, Rfl: 3    Allergies:   No Known Allergies    Objective   Vital Signs:   Vitals:    03/04/24 1526   BP: 128/58   BP Location: Left arm   Patient Position: Sitting   Pulse: 76   SpO2: 94%   Weight: 107 kg (236 lb)   Height: 182.9 cm (72\")       PHYSICAL EXAM  General appearance: Awake, alert, cooperative  Head: Normocephalic, without obvious abnormality, atraumatic  Eyes: Conjunctivae/corneas clear, EOMs intact  Neck: no adenopathy, no carotid bruit, no JVD, and thyroid: not enlarged  Lungs: clear to auscultation bilaterally and no rhonchi or crackles\", ' symmetric  Heart: regular rate and rhythm, S1, S2 normal, no murmur, click, rub or gallop  Abdomen: Soft, non-tender, bowel sounds normal,  no organomegaly  Extremities: extremities normal, atraumatic, no cyanosis or edema  Skin: Skin color, turgor normal, no rashes or lesions  Neurologic: Grossly normal     Lab Results   Component Value Date    GLUCOSE 83 02/07/2024    CALCIUM 9.5 02/07/2024     02/07/2024    K 4.2 02/07/2024    CO2 24.0 02/07/2024     02/07/2024    BUN 15 02/07/2024    CREATININE 0.94 02/07/2024    EGFRIFAFRI >150 06/11/2021    EGFRIFNONA >150 06/11/2021    BCR 16.0 02/07/2024    ANIONGAP 12.0 02/07/2024     Lab Results   Component Value Date    WBC 8.81 02/07/2024    HGB 15.2 02/07/2024    HCT 43.6 02/07/2024    MCV 90.6 02/07/2024     02/07/2024     No results found for: \"INR\", \"PROTIME\"  Lab Results   Component Value Date    TSH 1.470 02/07/2024       Cardiac Testing:     I personally viewed and interpreted the patient's EKG/Telemetry/lab data    Procedures    Tobacco Cessation: N/A  Obstructive Sleep Apnea Screening: " Completed    Advance Care Planning   ACP discussion was declined by the patient. Patient does not have an advance directive, declines further assistance.       Assessment & Plan    Diagnoses and all orders for this visit:    1. PVC (premature ventricular contraction) (Primary)    2. Chronic systolic congestive heart failure         Diagnosis Plan   1. PVC (premature ventricular contraction)  RV outflow tract morphology PVC.      2. 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  No scar abnormality on September 2023 nuclear stress test.    Unable to tolerate a repeat PET unable to tolerate an MRI due to claustrophobia.    Now that we have reduced his PVC burden from 33 to 18% with a low-dose of verapamil we will reassess with an echocardiogram with contrast.  If his EF is improved then he has a PVC cardiomyopathy.  At that point he would have a choice between continuing verapamil or catheter ablation.    Echo now.    Follow-up with me in 2 months.    Increase verapamil to twice daily.        Body mass index is 32.01 kg/m².    I spent 45 minutes in consultation with this patient which included more than 65% of this time in direct face-to-face counseling, physical examination and discussion of my assessment and findings and this shared decision making with the patient.  The remainder of the time not spent face-to-face was performing one, some or all of the following actions: preparing to see the patient (e.g. reviewing tests, prior clinicians' notes, etc), ordering medications, tests or procedures, coordination of care, discussion of the plan with other healthcare providers, documenting clinical information in epic as well as independently interpreting results and communication of these results to the patient family and/or caregiver(s).  Please note that this explicitly excludes time spent on other separate billable services such as performing procedures or test  interpretation, when applicable.      Follow Up:       Thank you for allowing me to participate in the care of your patient. Please to not hesitate to contact me with additional questions or concerns.      John Romo DO, FACC, RS  Cardiac Electrophysiologist  [facility] / [facility]

## 2024-04-10 ENCOUNTER — HOSPITAL ENCOUNTER (OUTPATIENT)
Dept: CARDIOLOGY | Facility: HOSPITAL | Age: 49
Discharge: HOME | End: 2024-04-10
Admitting: INTERNAL MEDICINE
Payer: MEDICAID

## 2024-04-10 VITALS — BODY MASS INDEX: 31.95 KG/M2 | HEIGHT: 72 IN | WEIGHT: 235.89 LBS

## 2024-04-10 DIAGNOSIS — I49.3 PVC (PREMATURE VENTRICULAR CONTRACTION): ICD-10-CM

## 2024-04-10 PROCEDURE — 93308 TTE F-UP OR LMTD: CPT

## 2024-04-10 PROCEDURE — 25010000002 SULFUR HEXAFLUORIDE MICROSPH 60.7-25 MG RECONSTITUTED SUSPENSION: Performed by: INTERNAL MEDICINE

## 2024-04-10 RX ADMIN — SULFUR HEXAFLUORIDE 2 ML: KIT at 16:30

## 2024-04-11 LAB
BH CV ECHO MEAS - EDV(CUBED): 170.6 ML
BH CV ECHO MEAS - EDV(MOD-SP2): 184 ML
BH CV ECHO MEAS - EDV(MOD-SP4): 176 ML
BH CV ECHO MEAS - EF(MOD-BP): 50 %
BH CV ECHO MEAS - EF(MOD-SP2): 52.2 %
BH CV ECHO MEAS - EF(MOD-SP4): 50.5 %
BH CV ECHO MEAS - ESV(CUBED): 57.4 ML
BH CV ECHO MEAS - ESV(MOD-SP2): 87.9 ML
BH CV ECHO MEAS - ESV(MOD-SP4): 87.1 ML
BH CV ECHO MEAS - FS: 30.5 %
BH CV ECHO MEAS - IVS/LVPW: 1.01 CM
BH CV ECHO MEAS - IVSD: 0.94 CM
BH CV ECHO MEAS - LA DIMENSION: 4.2 CM
BH CV ECHO MEAS - LV MASS(C)D: 198.8 GRAMS
BH CV ECHO MEAS - LVIDD: 5.5 CM
BH CV ECHO MEAS - LVIDS: 3.9 CM
BH CV ECHO MEAS - LVPWD: 0.93 CM
BH CV ECHO MEAS - SV(MOD-SP2): 96.1 ML
BH CV ECHO MEAS - SV(MOD-SP4): 88.9 ML
BH CV VAS BP RIGHT ARM: NORMAL MMHG

## 2024-04-12 ENCOUNTER — TELEPHONE (OUTPATIENT)
Dept: CARDIOLOGY | Facility: CLINIC | Age: 49
End: 2024-04-12
Payer: MEDICAID

## 2024-04-12 NOTE — TELEPHONE ENCOUNTER
Patient notified and aware of the results of his ECHO. He will keep his scheduled f/u with you.      * services were utilized with the conversation.*

## 2024-04-12 NOTE — TELEPHONE ENCOUNTER
----- Message from John Romo DO sent at 4/11/2024  6:26 PM EDT -----  Regarding: good results  Can you tell him his LVEF Has improved greatly.      He will need  for this.    I should have FU with him already set.  ----- Message -----  From: Pavel Baez III, MD  Sent: 4/11/2024   4:00 PM EDT  To: John Romo DO

## 2024-04-30 RX ORDER — SODIUM, POTASSIUM,MAG SULFATES 17.5-3.13G
2 SOLUTION, RECONSTITUTED, ORAL ORAL TAKE AS DIRECTED
Qty: 354 ML | Refills: 0 | Status: SHIPPED | OUTPATIENT
Start: 2024-04-30

## 2024-05-08 PROBLEM — I42.8 NICM (NONISCHEMIC CARDIOMYOPATHY): Status: ACTIVE | Noted: 2024-05-08

## 2024-06-10 DIAGNOSIS — E78.2 MIXED HYPERLIPIDEMIA: ICD-10-CM

## 2024-06-10 RX ORDER — FENOFIBRATE 145 MG/1
145 TABLET, COATED ORAL DAILY
Qty: 90 TABLET | Refills: 1 | Status: SHIPPED | OUTPATIENT
Start: 2024-06-10

## 2024-06-11 ENCOUNTER — PATIENT MESSAGE (OUTPATIENT)
Dept: CARDIOLOGY | Facility: CLINIC | Age: 49
End: 2024-06-11
Payer: MEDICAID

## 2024-06-11 RX ORDER — VERAPAMIL HYDROCHLORIDE 240 MG/1
240 TABLET, FILM COATED, EXTENDED RELEASE ORAL 2 TIMES DAILY
Qty: 180 TABLET | Refills: 2 | Status: SHIPPED | OUTPATIENT
Start: 2024-06-11

## 2024-06-12 NOTE — TELEPHONE ENCOUNTER
I spoke w/ the pt wife and explained to her that Verapamil is safe to take and he has tolerated it so far and that the pt has f/u appts w/ EP and cardio and will get EKGs. I let her know that I spoke w/ the pharmacists yesterday and today and that refills were sent in for medication. She is going to request refills from the pharmacy and let us know if she has any further issues.

## 2024-07-16 NOTE — PROGRESS NOTES
Follow-up Visit      Date: 2024  Patient Name: Fraknie Alatorre Ra  : 1975   MRN: [id number]     Chief Complaint:    Chief Complaint   Patient presents with    PVC (premature ventricular contraction)       History of Present Illness: Frankie Alatorre Ra is a 49 y.o. male who is here today for follow-up on his cardiomyopathy PVCs and Crestor problem.  He was initially started with verapamil for his PVCs which has improved a lot.  His EF has also improved.  He was started on Entresto also.  He denies any chest pain any shortness of breath any dizziness any palpitations or any other symptoms.  He has been taking his medications regularly.    He is improving his diet and trying to exercise.      Problem List     CARDIAC  Coronary Artery Disease:   2023 Nuclear stress, no ECG evidence of ishemia, no CT evidence of calcification      Myocardium:   2023 Echo EF 40%  2023 EF 41%, G1DD, RV mildly dilated.  2024 EF 50%    Valvular:   No known valvular disease     Electrical:   2023 Holter 33% ectopy  2024 Holter 18% ectopy     Pericardium:   Normal     CARDIAC RISK FACTORS  Dyslipidemia  2024   HDL 31   Obstructive Sleep Apnea?    NON-CARDIAC  GERD    SURGERIES  Bicep tendon repair         Subjective      Review of Systems:   Review of Systems   Cardiovascular:  Positive for palpitations.       Medications:     Current Outpatient Medications:     acetaminophen (TYLENOL) 500 MG tablet, Take 1 tablet by mouth Every 6 (Six) Hours As Needed for Mild Pain., Disp: , Rfl:     fenofibrate (TRICOR) 145 MG tablet, TAKE 1 TABLET BY MOUTH EVERY DAY, Disp: 90 tablet, Rfl: 1    sacubitril-valsartan (Entresto) 24-26 MG tablet, Take 1 tablet by mouth 2 (Two) Times a Day., Disp: 120 tablet, Rfl: 3    verapamil SR (CALAN-SR) 240 MG CR tablet, Take 1 tablet by mouth 2 (Two) Times a Day., Disp: 180 tablet, Rfl: 2    icosapent ethyl (Vascepa) 1 g capsule capsule, Take 2 g by  "mouth 2 (Two) Times a Day With Meals., Disp: 120 capsule, Rfl: 11    rosuvastatin (CRESTOR) 20 MG tablet, Take 1 tablet by mouth Daily., Disp: 90 tablet, Rfl: 11    Allergies:   No Known Allergies    Objective     Physical Exam:  Vitals:    07/18/24 1513   BP: 124/74   BP Location: Right arm   Patient Position: Sitting   Pulse: 83   SpO2: 94%   Weight: 107 kg (235 lb 12.8 oz)   Height: 182.9 cm (72\")     Body mass index is 31.98 kg/m².    Constitutional:       General: Not in acute distress.     Appearance: Healthy appearance. Not in distress.     Neck:     JVP:Not elevated     Carotid artery: Normal    Pulmonary:      Effort: Pulmonary effort is normal.      Breath sounds: Normal breath sounds. No wheezing. No rhonchi. No rales.     Cardiovascular:      Normal rate. Regular rhythm. Normal S1. Normal S2.      Murmurs: There is no significant murmur.      No gallop. No click. No rub.     Abdominal:      General: Bowel sounds are normal.      Palpations: Abdomen is soft.      Tenderness: There is no abdominal tenderness.    Extremities:     Pulses:Normal radial and pedal pulses     Edema:no edema    Smoking Cessation:   Tobacco Product History : Patient never smoked    Lab Review:   Lab Results   Component Value Date    GLUCOSE 83 02/07/2024    BUN 15 02/07/2024    CREATININE 0.94 02/07/2024    EGFRIFNONA >150 06/11/2021    EGFRIFAFRI >150 06/11/2021    BCR 16.0 02/07/2024    K 4.2 02/07/2024    CO2 24.0 02/07/2024    CALCIUM 9.5 02/07/2024    PROTENTOTREF 7.1 06/11/2021    ALBUMIN 5.1 02/07/2024    LABIL2 2.9 06/11/2021    AST 36 02/07/2024    ALT 58 (H) 02/07/2024     Lab Results   Component Value Date    WBC 8.81 02/07/2024    HGB 15.2 02/07/2024    HCT 43.6 02/07/2024    MCV 90.6 02/07/2024     02/07/2024     Lab Results   Component Value Date    TSH 1.470 02/07/2024             Assessment / Plan      Assessment:   Diagnosis Plan   1. Hypertriglyceridemia  Non-HDL Cholesterol Panel    Hepatic Function Panel "    Lipid Panel      2. Systolic congestive heart failure, unspecified HF chronicity  Non-HDL Cholesterol Panel    Hepatic Function Panel           Plan:  Patient triglyceride is still very high.  We will go ahead and start him on Crestor and Vascepa 2 mg twice daily.  He will check his lipid profile in 6 weeks.  Patient EF has improved back to normal.  We will keep him on current medications though if his EF dropped down we may switch him to beta-blocker for his PVCs.      Follow Up:       Return in about 6 months (around 1/18/2025).    Finn Escalante MD

## 2024-07-18 ENCOUNTER — OFFICE VISIT (OUTPATIENT)
Dept: CARDIOLOGY | Facility: CLINIC | Age: 49
End: 2024-07-18
Payer: MEDICAID

## 2024-07-18 VITALS
OXYGEN SATURATION: 94 % | DIASTOLIC BLOOD PRESSURE: 74 MMHG | WEIGHT: 235.8 LBS | HEART RATE: 83 BPM | HEIGHT: 72 IN | SYSTOLIC BLOOD PRESSURE: 124 MMHG | BODY MASS INDEX: 31.94 KG/M2

## 2024-07-18 DIAGNOSIS — E78.1 HYPERTRIGLYCERIDEMIA: Primary | ICD-10-CM

## 2024-07-18 DIAGNOSIS — I50.20 SYSTOLIC CONGESTIVE HEART FAILURE, UNSPECIFIED HF CHRONICITY: ICD-10-CM

## 2024-07-18 PROCEDURE — 99214 OFFICE O/P EST MOD 30 MIN: CPT | Performed by: INTERNAL MEDICINE

## 2024-07-18 PROCEDURE — 1160F RVW MEDS BY RX/DR IN RCRD: CPT | Performed by: INTERNAL MEDICINE

## 2024-07-18 PROCEDURE — 1159F MED LIST DOCD IN RCRD: CPT | Performed by: INTERNAL MEDICINE

## 2024-07-18 RX ORDER — ROSUVASTATIN CALCIUM 20 MG/1
20 TABLET, COATED ORAL DAILY
Qty: 90 TABLET | Refills: 11 | Status: SHIPPED | OUTPATIENT
Start: 2024-07-18

## 2024-07-18 RX ORDER — ICOSAPENT ETHYL 1 G/1
2 CAPSULE ORAL 2 TIMES DAILY WITH MEALS
Qty: 120 CAPSULE | Refills: 11 | Status: SHIPPED | OUTPATIENT
Start: 2024-07-18

## 2024-09-17 ENCOUNTER — HOSPITAL ENCOUNTER (EMERGENCY)
Facility: HOSPITAL | Age: 49
Discharge: HOME | End: 2024-09-17
Attending: EMERGENCY MEDICINE | Admitting: EMERGENCY MEDICINE
Payer: MEDICAID

## 2024-09-17 ENCOUNTER — APPOINTMENT (OUTPATIENT)
Dept: CT IMAGING | Facility: HOSPITAL | Age: 49
End: 2024-09-17
Payer: MEDICAID

## 2024-09-17 VITALS
OXYGEN SATURATION: 98 % | BODY MASS INDEX: 30.03 KG/M2 | TEMPERATURE: 97.8 F | SYSTOLIC BLOOD PRESSURE: 144 MMHG | HEIGHT: 74 IN | WEIGHT: 234 LBS | HEART RATE: 80 BPM | DIASTOLIC BLOOD PRESSURE: 87 MMHG | RESPIRATION RATE: 20 BRPM

## 2024-09-17 DIAGNOSIS — N23 RENAL COLIC ON LEFT SIDE: ICD-10-CM

## 2024-09-17 DIAGNOSIS — N20.0 KIDNEY STONE ON LEFT SIDE: Primary | ICD-10-CM

## 2024-09-17 LAB
ALBUMIN SERPL-MCNC: 4.3 G/DL (ref 3.5–5.2)
ALBUMIN/GLOB SERPL: 2.7 G/DL
ALP SERPL-CCNC: 47 U/L (ref 39–117)
ALT SERPL W P-5'-P-CCNC: 21 U/L (ref 1–41)
ANION GAP SERPL CALCULATED.3IONS-SCNC: 14 MMOL/L (ref 5–15)
AST SERPL-CCNC: 27 U/L (ref 1–40)
BACTERIA UR QL AUTO: ABNORMAL /HPF
BASOPHILS # BLD AUTO: 0.06 10*3/MM3 (ref 0–0.2)
BASOPHILS NFR BLD AUTO: 0.6 % (ref 0–1.5)
BILIRUB SERPL-MCNC: 0.2 MG/DL (ref 0–1.2)
BILIRUB UR QL STRIP: NEGATIVE
BUN SERPL-MCNC: 17 MG/DL (ref 6–20)
BUN/CREAT SERPL: 18.9 (ref 7–25)
CALCIUM SPEC-SCNC: 8.7 MG/DL (ref 8.6–10.5)
CHLORIDE SERPL-SCNC: 100 MMOL/L (ref 98–107)
CLARITY UR: CLEAR
CO2 SERPL-SCNC: 23 MMOL/L (ref 22–29)
COLOR UR: YELLOW
CREAT SERPL-MCNC: 0.9 MG/DL (ref 0.76–1.27)
DEPRECATED RDW RBC AUTO: 41.5 FL (ref 37–54)
EGFRCR SERPLBLD CKD-EPI 2021: 105.4 ML/MIN/1.73
EOSINOPHIL # BLD AUTO: 0.07 10*3/MM3 (ref 0–0.4)
EOSINOPHIL NFR BLD AUTO: 0.7 % (ref 0.3–6.2)
ERYTHROCYTE [DISTWIDTH] IN BLOOD BY AUTOMATED COUNT: 13 % (ref 12.3–15.4)
GLOBULIN UR ELPH-MCNC: 1.6 GM/DL
GLUCOSE SERPL-MCNC: 152 MG/DL (ref 65–99)
GLUCOSE UR STRIP-MCNC: NEGATIVE MG/DL
HCT VFR BLD AUTO: 39 % (ref 37.5–51)
HGB BLD-MCNC: 14.2 G/DL (ref 13–17.7)
HGB UR QL STRIP.AUTO: ABNORMAL
HOLD SPECIMEN: NORMAL
HYALINE CASTS UR QL AUTO: ABNORMAL /LPF
IMM GRANULOCYTES # BLD AUTO: 0.07 10*3/MM3 (ref 0–0.05)
IMM GRANULOCYTES NFR BLD AUTO: 0.7 % (ref 0–0.5)
KETONES UR QL STRIP: NEGATIVE
LEUKOCYTE ESTERASE UR QL STRIP.AUTO: NEGATIVE
LIPASE SERPL-CCNC: 42 U/L (ref 13–60)
LYMPHOCYTES # BLD AUTO: 1.71 10*3/MM3 (ref 0.7–3.1)
LYMPHOCYTES NFR BLD AUTO: 16 % (ref 19.6–45.3)
MCH RBC QN AUTO: 32 PG (ref 26.6–33)
MCHC RBC AUTO-ENTMCNC: 36.4 G/DL (ref 31.5–35.7)
MCV RBC AUTO: 87.8 FL (ref 79–97)
MONOCYTES # BLD AUTO: 0.52 10*3/MM3 (ref 0.1–0.9)
MONOCYTES NFR BLD AUTO: 4.9 % (ref 5–12)
NEUTROPHILS NFR BLD AUTO: 77.1 % (ref 42.7–76)
NEUTROPHILS NFR BLD AUTO: 8.26 10*3/MM3 (ref 1.7–7)
NITRITE UR QL STRIP: NEGATIVE
NRBC BLD AUTO-RTO: 0.8 /100 WBC (ref 0–0.2)
PH UR STRIP.AUTO: 5.5 [PH] (ref 5–8)
PLATELET # BLD AUTO: 319 10*3/MM3 (ref 140–450)
PMV BLD AUTO: 9.9 FL (ref 6–12)
POTASSIUM SERPL-SCNC: 4.3 MMOL/L (ref 3.5–5.2)
PROT SERPL-MCNC: 5.9 G/DL (ref 6–8.5)
PROT UR QL STRIP: NEGATIVE
RBC # BLD AUTO: 4.44 10*6/MM3 (ref 4.14–5.8)
RBC # UR STRIP: ABNORMAL /HPF
REF LAB TEST METHOD: ABNORMAL
SODIUM SERPL-SCNC: 137 MMOL/L (ref 136–145)
SP GR UR STRIP: 1.03 (ref 1–1.03)
SQUAMOUS #/AREA URNS HPF: ABNORMAL /HPF
UROBILINOGEN UR QL STRIP: ABNORMAL
WBC # UR STRIP: ABNORMAL /HPF
WBC NRBC COR # BLD AUTO: 10.69 10*3/MM3 (ref 3.4–10.8)
WHOLE BLOOD HOLD COAG: NORMAL
WHOLE BLOOD HOLD SPECIMEN: NORMAL

## 2024-09-17 PROCEDURE — 80053 COMPREHEN METABOLIC PANEL: CPT | Performed by: EMERGENCY MEDICINE

## 2024-09-17 PROCEDURE — 36415 COLL VENOUS BLD VENIPUNCTURE: CPT

## 2024-09-17 PROCEDURE — 96374 THER/PROPH/DIAG INJ IV PUSH: CPT

## 2024-09-17 PROCEDURE — 25010000002 MORPHINE PER 10 MG: Performed by: EMERGENCY MEDICINE

## 2024-09-17 PROCEDURE — 93005 ELECTROCARDIOGRAM TRACING: CPT | Performed by: EMERGENCY MEDICINE

## 2024-09-17 PROCEDURE — 96375 TX/PRO/DX INJ NEW DRUG ADDON: CPT

## 2024-09-17 PROCEDURE — 85025 COMPLETE CBC W/AUTO DIFF WBC: CPT | Performed by: EMERGENCY MEDICINE

## 2024-09-17 PROCEDURE — 83690 ASSAY OF LIPASE: CPT | Performed by: EMERGENCY MEDICINE

## 2024-09-17 PROCEDURE — 25010000002 HYDROMORPHONE PER 4 MG: Performed by: EMERGENCY MEDICINE

## 2024-09-17 PROCEDURE — 25010000002 ONDANSETRON PER 1 MG: Performed by: EMERGENCY MEDICINE

## 2024-09-17 PROCEDURE — 74176 CT ABD & PELVIS W/O CONTRAST: CPT

## 2024-09-17 PROCEDURE — 81001 URINALYSIS AUTO W/SCOPE: CPT | Performed by: EMERGENCY MEDICINE

## 2024-09-17 PROCEDURE — 99284 EMERGENCY DEPT VISIT MOD MDM: CPT

## 2024-09-17 RX ORDER — HYDROMORPHONE HYDROCHLORIDE 1 MG/ML
0.5 INJECTION, SOLUTION INTRAMUSCULAR; INTRAVENOUS; SUBCUTANEOUS ONCE
Status: COMPLETED | OUTPATIENT
Start: 2024-09-17 | End: 2024-09-17

## 2024-09-17 RX ORDER — TAMSULOSIN HYDROCHLORIDE 0.4 MG/1
1 CAPSULE ORAL NIGHTLY
Qty: 15 CAPSULE | Refills: 0 | Status: SHIPPED | OUTPATIENT
Start: 2024-09-17 | End: 2024-09-17

## 2024-09-17 RX ORDER — OXYCODONE AND ACETAMINOPHEN 5; 325 MG/1; MG/1
1 TABLET ORAL EVERY 8 HOURS PRN
Qty: 9 TABLET | Refills: 0 | Status: SHIPPED | OUTPATIENT
Start: 2024-09-17 | End: 2024-09-20

## 2024-09-17 RX ORDER — SODIUM CHLORIDE 9 MG/ML
10 INJECTION, SOLUTION INTRAMUSCULAR; INTRAVENOUS; SUBCUTANEOUS AS NEEDED
Status: DISCONTINUED | OUTPATIENT
Start: 2024-09-17 | End: 2024-09-17 | Stop reason: HOSPADM

## 2024-09-17 RX ORDER — ONDANSETRON 4 MG/1
4 TABLET, ORALLY DISINTEGRATING ORAL EVERY 8 HOURS PRN
Qty: 9 TABLET | Refills: 0 | Status: SHIPPED | OUTPATIENT
Start: 2024-09-17 | End: 2024-09-20

## 2024-09-17 RX ORDER — MORPHINE SULFATE 4 MG/ML
4 INJECTION, SOLUTION INTRAMUSCULAR; INTRAVENOUS ONCE
Status: COMPLETED | OUTPATIENT
Start: 2024-09-17 | End: 2024-09-17

## 2024-09-17 RX ORDER — TAMSULOSIN HYDROCHLORIDE 0.4 MG/1
1 CAPSULE ORAL DAILY
Qty: 5 CAPSULE | Refills: 0 | Status: SHIPPED | OUTPATIENT
Start: 2024-09-17 | End: 2024-09-22

## 2024-09-17 RX ORDER — ONDANSETRON 2 MG/ML
4 INJECTION INTRAMUSCULAR; INTRAVENOUS ONCE
Status: COMPLETED | OUTPATIENT
Start: 2024-09-17 | End: 2024-09-17

## 2024-09-17 RX ADMIN — HYDROMORPHONE HYDROCHLORIDE 0.5 MG: 1 INJECTION, SOLUTION INTRAMUSCULAR; INTRAVENOUS; SUBCUTANEOUS at 04:08

## 2024-09-17 RX ADMIN — MORPHINE SULFATE 4 MG: 4 INJECTION, SOLUTION INTRAMUSCULAR; INTRAVENOUS at 02:39

## 2024-09-17 RX ADMIN — ONDANSETRON 4 MG: 2 INJECTION INTRAMUSCULAR; INTRAVENOUS at 02:39

## 2024-09-20 LAB
QT INTERVAL: 374 MS
QTC INTERVAL: 392 MS

## 2024-10-30 ENCOUNTER — OFFICE VISIT (OUTPATIENT)
Dept: FAMILY MEDICINE CLINIC | Facility: CLINIC | Age: 49
End: 2024-10-30
Payer: MEDICAID

## 2024-10-30 VITALS
TEMPERATURE: 98.7 F | WEIGHT: 233.4 LBS | BODY MASS INDEX: 29.97 KG/M2 | DIASTOLIC BLOOD PRESSURE: 70 MMHG | OXYGEN SATURATION: 99 % | SYSTOLIC BLOOD PRESSURE: 128 MMHG | HEART RATE: 100 BPM

## 2024-10-30 DIAGNOSIS — R30.0 DYSURIA: ICD-10-CM

## 2024-10-30 DIAGNOSIS — R35.0 URINARY FREQUENCY: Primary | ICD-10-CM

## 2024-10-30 DIAGNOSIS — Z60.3 LANGUAGE BARRIER: ICD-10-CM

## 2024-10-30 DIAGNOSIS — R11.0 NAUSEA: ICD-10-CM

## 2024-10-30 DIAGNOSIS — Z75.8 LANGUAGE BARRIER: ICD-10-CM

## 2024-10-30 DIAGNOSIS — N20.0 KIDNEY STONE ON LEFT SIDE: ICD-10-CM

## 2024-10-30 DIAGNOSIS — R10.9 LEFT FLANK PAIN: ICD-10-CM

## 2024-10-30 LAB
BILIRUB BLD-MCNC: NEGATIVE MG/DL
CLARITY, POC: CLEAR
COLOR UR: YELLOW
EXPIRATION DATE: ABNORMAL
GLUCOSE UR STRIP-MCNC: NEGATIVE MG/DL
KETONES UR QL: NEGATIVE
LEUKOCYTE EST, POC: NEGATIVE
Lab: ABNORMAL
NITRITE UR-MCNC: NEGATIVE MG/ML
PH UR: 6 [PH] (ref 5–8)
PROT UR STRIP-MCNC: NEGATIVE MG/DL
RBC # UR STRIP: ABNORMAL /UL
SP GR UR: 1.03 (ref 1–1.03)
UROBILINOGEN UR QL: NORMAL

## 2024-10-30 PROCEDURE — 99214 OFFICE O/P EST MOD 30 MIN: CPT | Performed by: FAMILY MEDICINE

## 2024-10-30 PROCEDURE — 1125F AMNT PAIN NOTED PAIN PRSNT: CPT | Performed by: FAMILY MEDICINE

## 2024-10-30 PROCEDURE — 87086 URINE CULTURE/COLONY COUNT: CPT | Performed by: FAMILY MEDICINE

## 2024-10-30 PROCEDURE — 1159F MED LIST DOCD IN RCRD: CPT | Performed by: FAMILY MEDICINE

## 2024-10-30 PROCEDURE — 1160F RVW MEDS BY RX/DR IN RCRD: CPT | Performed by: FAMILY MEDICINE

## 2024-10-30 RX ORDER — TAMSULOSIN HYDROCHLORIDE 0.4 MG/1
1 CAPSULE ORAL NIGHTLY
Qty: 15 CAPSULE | Refills: 0 | Status: SHIPPED | OUTPATIENT
Start: 2024-10-30

## 2024-10-30 RX ORDER — OXYCODONE AND ACETAMINOPHEN 5; 325 MG/1; MG/1
1 TABLET ORAL EVERY 8 HOURS PRN
Qty: 9 TABLET | Refills: 0 | Status: SHIPPED | OUTPATIENT
Start: 2024-10-30 | End: 2024-11-02

## 2024-10-30 RX ORDER — ONDANSETRON 4 MG/1
4 TABLET, ORALLY DISINTEGRATING ORAL EVERY 8 HOURS PRN
Qty: 9 TABLET | Refills: 0 | Status: SHIPPED | OUTPATIENT
Start: 2024-10-30 | End: 2024-11-02

## 2024-10-30 RX ORDER — PREDNISONE 10 MG/1
10 TABLET ORAL 2 TIMES DAILY
COMMUNITY

## 2024-10-30 SDOH — SOCIAL STABILITY - SOCIAL INSECURITY: ACCULTURATION DIFFICULTY: Z60.3

## 2024-10-30 NOTE — PROGRESS NOTES
Follow Up Office Visit      Date: 10/30/2024   Patient Name: Frankie Alatorre Ra  : 1975   MRN: [id number]     Chief Complaint:    Chief Complaint   Patient presents with    Pain     Pain on left side. Went to ER on 10/17/24 found stone on imagine. Was given medication but pain came back wants to know if stone is still present.       History of Present Illness: Frankie Alatorre Ra is a 49 y.o. male who presents today for evaluation of pain on left side.      : Christoph  [id number] ID Sinhala.    History of kidney stone.  Sees JESSICA Giron for PCP.    Patient was seen in ER on 2024.  Noted to have 2 mm obstructing calculus distal left ureter at the ureterovesicular junction.  Also noted was mild proximal hydro ureteronephrosis.      Reports left lower abd pain.  Reports at the time it was 10/10.  Had imaging and found to have 2 mm stone.  Had pain for about 4 days then it went away.    Reports the pain went away but came back again but not constant.  Up to 5/10,   Reports associated burning with urination now.        Subjective      Review of Systems:   Review of Systems   Constitutional:  Negative for chills and fever.   Gastrointestinal:  Positive for nausea. Negative for vomiting.   Genitourinary:  Positive for dysuria and flank pain (left).       I have reviewed the patients family history, social history, past medical history, past surgical history and have updated it as appropriate.     Medications:     Current Outpatient Medications:     fenofibrate (TRICOR) 145 MG tablet, TAKE 1 TABLET BY MOUTH EVERY DAY, Disp: 90 tablet, Rfl: 1    icosapent ethyl (Vascepa) 1 g capsule capsule, Take 2 g by mouth 2 (Two) Times a Day With Meals., Disp: 120 capsule, Rfl: 11    predniSONE (DELTASONE) 10 MG tablet, Take 1 tablet by mouth 2 (Two) Times a Day., Disp: , Rfl:     rosuvastatin (CRESTOR) 20 MG tablet, Take 1 tablet by mouth Daily., Disp: 90 tablet, Rfl: 11    sacubitril-valsartan  (Entresto) 24-26 MG tablet, Take 1 tablet by mouth 2 (Two) Times a Day., Disp: 120 tablet, Rfl: 3    tamsulosin (FLOMAX) 0.4 MG capsule 24 hr capsule, Take 1 capsule by mouth Every Night., Disp: 15 capsule, Rfl: 0    verapamil SR (CALAN-SR) 240 MG CR tablet, Take 1 tablet by mouth 2 (Two) Times a Day., Disp: 180 tablet, Rfl: 2    acetaminophen (TYLENOL) 500 MG tablet, Take 1 tablet by mouth Every 6 (Six) Hours As Needed for Mild Pain., Disp: , Rfl:     Allergies:   No Known Allergies    Objective     Physical Exam: Please see above  Vital Signs:   Vitals:    10/30/24 1347   BP: 128/70   Pulse: 100   Temp: 98.7 °F (37.1 °C)   TempSrc: Temporal   SpO2: 99%   Weight: 106 kg (233 lb 6.4 oz)   PainSc:   6   PainLoc: Comment: left side comes and goes     Body mass index is 29.97 kg/m².         Physical Exam  Vitals and nursing note reviewed.   Constitutional:       Appearance: Normal appearance.   HENT:      Head: Normocephalic and atraumatic.   Neck:      Vascular: No carotid bruit.   Cardiovascular:      Rate and Rhythm: Normal rate and regular rhythm.      Heart sounds: Normal heart sounds. No murmur heard.  Pulmonary:      Effort: Pulmonary effort is normal.      Breath sounds: Normal breath sounds.   Abdominal:      General: Bowel sounds are normal.      Palpations: Abdomen is soft. There is no mass.      Tenderness: There is no abdominal tenderness. There is left CVA tenderness (mild). There is no right CVA tenderness, guarding or rebound.   Musculoskeletal:      Right lower leg: No edema.      Left lower leg: No edema.   Skin:     Coloration: Skin is not jaundiced or pale.      Findings: No erythema.   Neurological:      Mental Status: He is alert. Mental status is at baseline.   Psychiatric:         Mood and Affect: Mood normal.         Behavior: Behavior normal.         Procedures    Results:   Labs:   Hemoglobin A1C   Date Value Ref Range Status   07/07/2023 5.40 4.80 - 5.60 % Final     TSH   Date Value Ref  Range Status   02/07/2024 1.470 0.270 - 4.200 uIU/mL Final        POCT Results (if applicable):   Results for orders placed or performed in visit on 10/30/24   POC Urinalysis Dipstick, Automated    Collection Time: 10/30/24  2:31 PM    Specimen: Urine   Result Value Ref Range    Color Yellow Yellow, Straw, Dark Yellow, Reva    Clarity, UA Clear Clear    Specific Gravity  1.030 1.005 - 1.030    pH, Urine 6.0 5.0 - 8.0    Leukocytes Negative Negative    Nitrite, UA Negative Negative    Protein, POC Negative Negative mg/dL    Glucose, UA Negative Negative mg/dL    Ketones, UA Negative Negative    Urobilinogen, UA Normal Normal, 0.2 E.U./dL    Bilirubin Negative Negative    Blood, UA 3+ (A) Negative    Lot Number [id number]     Expiration Date 1/13/26    Urine Culture - Urine, Urine, Clean Catch    Collection Time: 10/30/24  5:16 PM    Specimen: Urine, Clean Catch   Result Value Ref Range    Urine Culture No growth        Imaging:   No valid procedures specified.         Assessment / Plan      Assessment/Plan:   Manoj report reviewed.    Patient with reported urinary frequency and dysuria.  No visible blood in urine.  Had reported previous left flank pain had resolved, but now has returned.  Reports now pain is not continuous, and not as severe as previous pain.  CT abdomen pelvis from 9/17/2024 2 mm obstructing calculus distal left ureter with mild proximal hydro ureteronephrosis.  At this point is unclear if the patient had passed a stone and now has a UTI, or still has a stone.  Will check urine for UTI, and send for culture.  Will prescribe short course of previously prescribed pain medication to help with acute pain.  Will prescribe additional course of Flomax.  Will refer patient to urology.  Will get CT abdomen and pelvis without contrast.  Will prescribe Zofran for nausea.  Encourage patient to drink plenty water.  Future treatment depends on test results including urine culture.  Patient was instructed to  go to ER for evaluation if pain worsens.  Patient expressed understanding and agreed to do so.        1. Urinary frequency    - POC Urinalysis Dipstick, Automated  - Urine Culture - Urine, Urine, Clean Catch; Future  - Urine Culture - Urine, Urine, Clean Catch    2. Dysuria    - POC Urinalysis Dipstick, Automated  - Urine Culture - Urine, Urine, Clean Catch; Future  - Urine Culture - Urine, Urine, Clean Catch    3. Left flank pain    - oxyCODONE-acetaminophen (PERCOCET) 5-325 MG per tablet; Take 1 tablet by mouth Every 8 (Eight) Hours As Needed for Moderate Pain for up to 3 days. Short course  Dispense: 9 tablet; Refill: 0  - tamsulosin (FLOMAX) 0.4 MG capsule 24 hr capsule; Take 1 capsule by mouth Every Night.  Dispense: 15 capsule; Refill: 0  - Ambulatory Referral to Urology  - CT Abdomen Pelvis Without Contrast; Future    4. Kidney stone on left side    - oxyCODONE-acetaminophen (PERCOCET) 5-325 MG per tablet; Take 1 tablet by mouth Every 8 (Eight) Hours As Needed for Moderate Pain for up to 3 days. Short course  Dispense: 9 tablet; Refill: 0  - tamsulosin (FLOMAX) 0.4 MG capsule 24 hr capsule; Take 1 capsule by mouth Every Night.  Dispense: 15 capsule; Refill: 0  - Ambulatory Referral to Urology  - CT Abdomen Pelvis Without Contrast; Future    5. Nausea    - ondansetron ODT (ZOFRAN-ODT) 4 MG disintegrating tablet; Place 1 tablet on the tongue Every 8 (Eight) Hours As Needed for Nausea or Vomiting for up to 3 days.  Dispense: 9 tablet; Refill: 0      6. Language barrier   used.    39 minutes were spent on this patient encounter which included history taking, physical exam, answering patient questions, counseling, discussing treatment plan, placing orders, and documentation.        Part of this note may be an electronic transcription/translation of spoken language to printed text using the Dragon Dictation System.      Vaccine Counseling:      Follow Up:   Return if symptoms worsen or fail to improve,  or as scheduled with pcp..      DO JAMES Livingston

## 2024-10-30 NOTE — PATIENT INSTRUCTIONS
Keep appt with Urology.  CT scan soon.  Flomax as ordered.  Zofran as ordered.  Pain medication as ordered.  If pain worsens, go to ER.

## 2024-11-01 LAB — BACTERIA SPEC AEROBE CULT: NO GROWTH

## 2024-11-09 ENCOUNTER — HOSPITAL ENCOUNTER (OUTPATIENT)
Dept: CT IMAGING | Facility: HOSPITAL | Age: 49
Discharge: HOME | End: 2024-11-09
Payer: MEDICAID

## 2024-11-09 DIAGNOSIS — N20.0 KIDNEY STONE ON LEFT SIDE: ICD-10-CM

## 2024-11-09 DIAGNOSIS — R10.9 LEFT FLANK PAIN: ICD-10-CM

## 2024-11-09 PROCEDURE — 74176 CT ABD & PELVIS W/O CONTRAST: CPT

## 2024-11-14 ENCOUNTER — TELEPHONE (OUTPATIENT)
Dept: UROLOGY | Facility: CLINIC | Age: 49
End: 2024-11-14

## 2024-11-14 NOTE — TELEPHONE ENCOUNTER
This patient requires an  for their appointment. Please see the  information below.     Caller: SUGEY FUNG RA  Relationship to patient: SELF  Best call back number: [phone]    Service:Frisian LANGUAGE  Is  needs updated in registration: YES  Date of Appointment:12/17/24  Time of Appointment:10:40 AM  Additional notes:[place] LOCATION. NO CALL BACK NEEDED. SENDING TELEPHONE ENCOUNTER TO RESERVE  DEVICE.

## 2024-11-21 ENCOUNTER — TELEPHONE (OUTPATIENT)
Dept: FAMILY MEDICINE CLINIC | Facility: CLINIC | Age: 49
End: 2024-11-21
Payer: MEDICAID

## 2024-11-21 NOTE — TELEPHONE ENCOUNTER
Called patient wife and explained the below message and she voiced understanding.    UA results were discussed in office with patient.  Let patient know urine culture was reported as no growth.  Remind patient to keep appointment with urology when scheduled.  CT scan when scheduled.  Remind patient if his pain worsens, he needs to go to the ER.

## 2024-11-22 ENCOUNTER — TELEPHONE (OUTPATIENT)
Dept: FAMILY MEDICINE CLINIC | Facility: CLINIC | Age: 49
End: 2024-11-22
Payer: MEDICAID

## 2024-11-22 NOTE — TELEPHONE ENCOUNTER
LVM VIA     HUB TO RELAY    Let patient know recent CT abdomen and pelvis without contrast was reported as no acute process or significant abnormality was identified.  Interval passage of tiny distal left ureteral stone was noted

## 2024-12-17 ENCOUNTER — OFFICE VISIT (OUTPATIENT)
Age: 49
End: 2024-12-17
Payer: MEDICAID

## 2024-12-17 DIAGNOSIS — N20.0 KIDNEY STONE: Primary | ICD-10-CM

## 2024-12-17 LAB
BILIRUB BLD-MCNC: NEGATIVE MG/DL
CLARITY, POC: CLEAR
COLOR UR: ABNORMAL
EXPIRATION DATE: ABNORMAL
GLUCOSE UR STRIP-MCNC: NEGATIVE MG/DL
KETONES UR QL: NEGATIVE
LEUKOCYTE EST, POC: NEGATIVE
Lab: ABNORMAL
NITRITE UR-MCNC: NEGATIVE MG/ML
PH UR: 6 [PH] (ref 5–8)
PROT UR STRIP-MCNC: NEGATIVE MG/DL
RBC # UR STRIP: ABNORMAL /UL
SP GR UR: 1.02 (ref 1–1.03)
UROBILINOGEN UR QL: NORMAL

## 2024-12-17 NOTE — PROGRESS NOTES
Office Note Kidney Stone      Patient Name: Frankie Alatorre Ra  : 1975   MRN: [id number]     Chief Complaint: History of Kidney Stones.    Chief Complaint   Patient presents with    Flank Pain       Referring Provider: Dago Blunt DO     (Romansh): [id number]    History of Present Illness: Frankie Alatorre Ra is a 49 y.o. male who presents today for history of Kidney Stones.   He had a repeat scan which showed he has passed his stone.    He reports no dysuria or gross hematuria.  Still has intermittent back pain.  He reports the pain is on both sides.  The pain is worse with movement.   He reports no injury        I reviewed his CT scan which shows no hydronephrosis and no nephrolithiasis.        Subjective      Review of System:   Review of Systems   I have reviewed the ROS documented by my clinical staff, I have updated appropriately and I agree. Colt Stanton MD    Past Medical History:   Past Medical History:   Diagnosis Date    Abnormal ECG     Snoring     Ventricular tachycardia (paroxysmal)        Past Surgical History:   Past Surgical History:   Procedure Laterality Date    BICEPS TENDON REPAIR Left 2022    Procedure: BICEPS TENDON REPAIR LEFT;  Surgeon: Noah Herron MD;  Location: [facility];  Service: Orthopedics;  Laterality: Left;       Family History:   Family History   Problem Relation Age of Onset    Asthma Father        Social History:   Social History     Socioeconomic History    Marital status:    Tobacco Use    Smoking status: Never     Passive exposure: Never    Smokeless tobacco: Never   Vaping Use    Vaping status: Never Used   Substance and Sexual Activity    Alcohol use: No    Drug use: No    Sexual activity: Yes     Partners: Female       Medications:     Current Outpatient Medications:     acetaminophen (TYLENOL) 500 MG tablet, Take 1 tablet by mouth Every 6 (Six) Hours As Needed for Mild Pain., Disp: , Rfl:      fenofibrate (TRICOR) 145 MG tablet, TAKE 1 TABLET BY MOUTH EVERY DAY, Disp: 90 tablet, Rfl: 1    icosapent ethyl (Vascepa) 1 g capsule capsule, Take 2 g by mouth 2 (Two) Times a Day With Meals., Disp: 120 capsule, Rfl: 11    predniSONE (DELTASONE) 10 MG tablet, Take 1 tablet by mouth 2 (Two) Times a Day., Disp: , Rfl:     rosuvastatin (CRESTOR) 20 MG tablet, Take 1 tablet by mouth Daily., Disp: 90 tablet, Rfl: 11    sacubitril-valsartan (Entresto) 24-26 MG tablet, Take 1 tablet by mouth 2 (Two) Times a Day., Disp: 120 tablet, Rfl: 3    tamsulosin (FLOMAX) 0.4 MG capsule 24 hr capsule, Take 1 capsule by mouth Every Night., Disp: 15 capsule, Rfl: 0    verapamil SR (CALAN-SR) 240 MG CR tablet, Take 1 tablet by mouth 2 (Two) Times a Day., Disp: 180 tablet, Rfl: 2    Allergies:   No Known Allergies    IPSS Questionnaire (AUA-7):  Over the past month…    1)  Incomplete Emptying  How often have you had a sensation of not emptying your bladder?  1 - Less than 1 time in 5   2)  Frequency  How often have you had to urinate less than every two hours? 0 - Not at all   3)  Intermittency  How often have you found you stopped and started again several times when you urinated?  3 - About half the time   4) Urgency  How often have you found it difficult to postpone urination?  1 - Less than 1 time in 5   5) Weak Stream  How often have you had a weak urinary stream?  1 - Less than 1 time in 5   6) Straining  How often have you had to push or strain to begin urination?  0 - Not at all   7) Nocturia  How many times did you typically get up at night to urinate?  1 - 1 time   Total Score:  7       Quality of life due to urinary symptoms:  If you were to spend the rest of your life with your urinary condition the way it is now, how would you feel about that? 2-Mostly Satisfied   Urine Leakage (Incontinence) 0-No Leakage       Objective     Physical Exam:   Vital Signs: There were no vitals filed for this visit.  There is no height or  weight on file to calculate BMI.     Physical Exam  Vitals and nursing note reviewed.   Constitutional:       General: He is awake. He is not in acute distress.     Appearance: Normal appearance. He is well-developed.   HENT:      Head: Normocephalic and atraumatic.      Right Ear: External ear normal.      Left Ear: External ear normal.      Nose: Nose normal.   Eyes:      Conjunctiva/sclera: Conjunctivae normal.   Pulmonary:      Effort: Pulmonary effort is normal.   Abdominal:      General: There is no distension.      Palpations: Abdomen is soft. There is no mass.      Tenderness: There is no abdominal tenderness. There is no right CVA tenderness, left CVA tenderness, guarding or rebound.      Hernia: No hernia is present. There is no hernia in the left inguinal area or right inguinal area.   Genitourinary:     Pubic Area: No rash.       Rectum: No mass or tenderness. Normal anal tone.   Musculoskeletal:      Cervical back: Normal range of motion.   Lymphadenopathy:      Lower Body: No right inguinal adenopathy. No left inguinal adenopathy.   Skin:     General: Skin is warm.   Neurological:      General: No focal deficit present.      Mental Status: He is alert and oriented to person, place, and time.   Psychiatric:         Behavior: Behavior normal. Behavior is cooperative.         Labs:   Brief Urine Lab Results  (Last result in the past 365 days)        Color   Clarity   Blood   Leuk Est   Nitrite   Protein   CREAT   Urine HCG        12/17/24 1104 Dark Yellow   Clear   3+   Negative   Negative   Negative                   Urine Culture          10/30/2024    17:16   Urine Culture   Urine Culture No growth         Color, UA   Date Value Ref Range Status   09/17/2024 Yellow Yellow, Straw Final     Color   Date Value Ref Range Status   12/17/2024 Dark Yellow Yellow, Straw, Dark Yellow, Reva Final     Bilirubin, UA   Date Value Ref Range Status   09/17/2024 Negative Negative Final     Bilirubin   Date Value Ref  "Range Status   12/17/2024 Negative Negative Final     Urobilinogen, UA   Date Value Ref Range Status   12/17/2024 Normal Normal, 0.2 E.U./dL Final   09/17/2024 1.0 E.U./dL 0.2 - 1.0 E.U./dL Final     Blood, UA   Date Value Ref Range Status   12/17/2024 3+ (A) Negative Final     Leuk Esterase, UA   Date Value Ref Range Status   09/17/2024 Negative Negative Final     Leukocytes   Date Value Ref Range Status   12/17/2024 Negative Negative Final       Lab Results   Component Value Date    GLUCOSE 152 (H) 09/17/2024    CALCIUM 8.7 09/17/2024     09/17/2024    K 4.3 09/17/2024    CO2 23.0 09/17/2024     09/17/2024    BUN 17 09/17/2024    CREATININE 0.90 09/17/2024    EGFRIFAFRI >150 06/11/2021    EGFRIFNONA >150 06/11/2021    BCR 18.9 09/17/2024    ANIONGAP 14.0 09/17/2024       Lab Results   Component Value Date    WBC 10.69 09/17/2024    HGB 14.2 09/17/2024    HCT 39.0 09/17/2024    MCV 87.8 09/17/2024     09/17/2024       Stone Analysis  No components found for: \"ORJQR9RDNUMO\", \"FPPRY5PNHWYR\", \"WDOLK1DFHGOS\"    Images:   CT Abdomen Pelvis Without Contrast    Result Date: 11/12/2024  Impression: 1.No acute process nor significant abnormality identified. Interval passage of tiny distal left ureteral calculus. Electronically Signed: Nolberto Adams MD  11/12/2024 11:43 AM EST  Workstation ID: [id number]    CT Abdomen Pelvis Without Contrast    Result Date: 9/17/2024  Impression: 1.Obstructing calculus present within the distal left ureter at the left ureterovesicular junction measuring approximately 2 mm with mild proximal hydroureteronephrosis. 2.Ancillary findings as described above. Electronically Signed: Zarina Hussein MD  9/17/2024 3:02 AM EDT  Workstation ID: [id number]      Measures:   Tobacco:   Frankie Alatorre Ra  reports that he has never smoked. He has never been exposed to tobacco smoke. He has never used smokeless tobacco.       Urine Incontinence: Patient reports that he is not currently " experiencing any symptoms of urinary incontinence.         Assessment / Plan      Assessment/Plan:   Frankie Alatorre Ra is a 49 y.o. male who presented today for kidney stones.  Based on his imaging he has been able to pass his stone.  However, he has bilateral lower back pain.  I discussed that he may benefit from PT - he will discussed with his PCP.    I will have him follow up in 4 months with a new U/A as he still had some microhematuria.  We will insure that clears up but if not he will need hematuria work up.     Diagnoses and all orders for this visit:    1. Kidney stone (Primary)  -     POC Urinalysis Dipstick, Automated        Patient Education:   1. Fluid intake goal ~ 2.5L per day.  2.  Maintain a normal dietary calcium intake.  Take any calcium supplements with food.     Follow Up:   Return in about 4 months (around 4/17/2025), or if symptoms worsen or fail to improve, for Recheck.        Colt Stanton MD  [facility] [place]

## 2025-01-02 ENCOUNTER — LAB (OUTPATIENT)
Dept: LAB | Facility: HOSPITAL | Age: 50
End: 2025-01-02
Payer: MEDICAID

## 2025-01-02 ENCOUNTER — OFFICE VISIT (OUTPATIENT)
Dept: FAMILY MEDICINE CLINIC | Facility: CLINIC | Age: 50
End: 2025-01-02
Payer: MEDICAID

## 2025-01-02 VITALS
OXYGEN SATURATION: 96 % | WEIGHT: 232 LBS | HEART RATE: 101 BPM | TEMPERATURE: 97.6 F | BODY MASS INDEX: 29.77 KG/M2 | SYSTOLIC BLOOD PRESSURE: 124 MMHG | HEIGHT: 74 IN | DIASTOLIC BLOOD PRESSURE: 62 MMHG

## 2025-01-02 DIAGNOSIS — E78.1 HYPERTRIGLYCERIDEMIA: ICD-10-CM

## 2025-01-02 DIAGNOSIS — R30.9 PAINFUL URINATION: ICD-10-CM

## 2025-01-02 DIAGNOSIS — I50.20 SYSTOLIC CONGESTIVE HEART FAILURE, UNSPECIFIED HF CHRONICITY: ICD-10-CM

## 2025-01-02 DIAGNOSIS — R30.9 PAINFUL URINATION: Primary | ICD-10-CM

## 2025-01-02 LAB
ARTICHOKE IGE QN: 39 MG/DL (ref 0–100)
CHOLEST SERPL-MCNC: 294 MG/DL (ref 0–200)
HDLC SERPL-MCNC: 26 MG/DL (ref 40–60)
LDLC SERPL CALC-MCNC: ABNORMAL MG/DL
LDLC/HDLC SERPL: ABNORMAL {RATIO}
PSA SERPL-MCNC: 1.23 NG/ML (ref 0–4)
TRIGL SERPL-MCNC: 1461 MG/DL (ref 0–150)
VLDLC SERPL-MCNC: ABNORMAL MG/DL

## 2025-01-02 PROCEDURE — 99213 OFFICE O/P EST LOW 20 MIN: CPT | Performed by: FAMILY MEDICINE

## 2025-01-02 PROCEDURE — 36415 COLL VENOUS BLD VENIPUNCTURE: CPT

## 2025-01-02 PROCEDURE — 83721 ASSAY OF BLOOD LIPOPROTEIN: CPT

## 2025-01-02 PROCEDURE — 80061 LIPID PANEL: CPT

## 2025-01-02 PROCEDURE — 84153 ASSAY OF PSA TOTAL: CPT

## 2025-01-02 PROCEDURE — 1125F AMNT PAIN NOTED PAIN PRSNT: CPT | Performed by: FAMILY MEDICINE

## 2025-01-02 PROCEDURE — 80076 HEPATIC FUNCTION PANEL: CPT

## 2025-01-02 PROCEDURE — 1159F MED LIST DOCD IN RCRD: CPT | Performed by: FAMILY MEDICINE

## 2025-01-02 PROCEDURE — 1160F RVW MEDS BY RX/DR IN RCRD: CPT | Performed by: FAMILY MEDICINE

## 2025-01-02 RX ORDER — CYCLOBENZAPRINE HCL 5 MG
5 TABLET ORAL 3 TIMES DAILY
Qty: 90 TABLET | Refills: 0 | Status: SHIPPED | OUTPATIENT
Start: 2025-01-02

## 2025-01-02 RX ORDER — HYDROXYZINE HYDROCHLORIDE 25 MG/1
25 TABLET, FILM COATED ORAL 3 TIMES DAILY
Qty: 90 TABLET | Refills: 0 | Status: SHIPPED | OUTPATIENT
Start: 2025-01-02

## 2025-01-02 NOTE — PROGRESS NOTES
Follow Up Office Visit      Patient Name: Frankie Alatorre Ra  : 1975   MRN: [id number]     Chief Complaint:    Chief Complaint   Patient presents with    Flank Pain     Pt states that even tho they have cleared him with urology he is still have pains in the pelvis area  He states that he told the urologist and they advised it was no longer stone related       History of Present Illness: Frankie Alatorre Ra is a 49 y.o. male who is here today to follow up with abdominal pain associated with urination.  To urology and they did not feel like his pain was related to kidney stones.  Patient says that he never had any pain until he went to the ER and was told that he had a small kidney stone.  Patient reports that he stopped Flomax.  He says that 2-3 times per day he will get pain for about 15 minutes in his lower abdomen and it does seem to improve after urination.  Patient says laying down also improves the pain.    Physical exam: Patient left lower quadrant abdomen is slightly tender on exam.  Suprapubic region also has tenderness on exam.      Subjective        I have reviewed and the following portions of the patient's history were updated as appropriate: past family history, past medical history, past social history, past surgical history and problem list.    Medications:     Current Outpatient Medications:     acetaminophen (TYLENOL) 500 MG tablet, Take 1 tablet by mouth Every 6 (Six) Hours As Needed for Mild Pain., Disp: , Rfl:     rosuvastatin (CRESTOR) 20 MG tablet, Take 1 tablet by mouth Daily., Disp: 90 tablet, Rfl: 11    sacubitril-valsartan (Entresto) 24-26 MG tablet, Take 1 tablet by mouth 2 (Two) Times a Day., Disp: 120 tablet, Rfl: 3    cyclobenzaprine (FLEXERIL) 5 MG tablet, Take 1 tablet by mouth 3 times a day., Disp: 90 tablet, Rfl: 0    fenofibrate (TRICOR) 145 MG tablet, TAKE 1 TABLET BY MOUTH EVERY DAY, Disp: 90 tablet, Rfl: 1    hydrOXYzine (ATARAX) 25 MG tablet, Take 1  "tablet by mouth 3 times a day., Disp: 90 tablet, Rfl: 0    icosapent ethyl (Vascepa) 1 g capsule capsule, Take 2 g by mouth 2 (Two) Times a Day With Meals., Disp: 120 capsule, Rfl: 11    verapamil SR (CALAN-SR) 240 MG CR tablet, Take 1 tablet by mouth 2 (Two) Times a Day., Disp: 180 tablet, Rfl: 2    Allergies:   No Known Allergies    Objective     Physical Exam: Please see above  Vital Signs:   Vitals:    01/02/25 0929   BP: 124/62   Pulse: 101   Temp: 97.6 °F (36.4 °C)   TempSrc: Infrared   SpO2: 96%   Weight: 105 kg (232 lb)   Height: 188 cm (74.02\")   PainSc:   6     Body mass index is 29.77 kg/m².          Assessment / Plan      Assessment/Plan:   Diagnoses and all orders for this visit:    1. Painful urination (Primary)  -     PSA DIAGNOSTIC ONLY; Future  -     cyclobenzaprine (FLEXERIL) 5 MG tablet; Take 1 tablet by mouth 3 times a day.  Dispense: 90 tablet; Refill: 0  -     hydrOXYzine (ATARAX) 25 MG tablet; Take 1 tablet by mouth 3 times a day.  Dispense: 90 tablet; Refill: 0    Patient denies back pain.  Denies pain in his lateral aspect of his torso.  His pain is suprapubic and also left lower quadrant of abdomen.  He is reporting symptoms related to urination and also they improved with laying down.  Not having any back pain.  Given the fact the patient's symptoms are related to urination and they improve about 15 minutes after urinating-treating for cystitis.  Try Flexeril and hydroxyzine.  I think maybe this is a complication of his past kidney stone.  Hopefully his symptoms will improve over the next 1 or 2 months, but he may need to be referred back to urology if he continues to have pain and urination issues.    Recommend Flexeril for 2 weeks as prescribed.  If not working, he can switch to hydroxyzine.  Which ever medication works that she should stay on.  Follow-up in 1 month for reassessment.  PSA ordered today    Follow Up:   Return in about 4 weeks (around 1/30/2025) for Labs today, painful " urination.    Geovani Smallwood DO  [facility] [place]

## 2025-01-03 ENCOUNTER — TELEPHONE (OUTPATIENT)
Dept: CARDIOLOGY | Facility: CLINIC | Age: 50
End: 2025-01-03
Payer: MEDICAID

## 2025-01-03 LAB
CHOLEST SERPL-MCNC: 339 MG/DL (ref 100–199)
HDLC SERPL-MCNC: 24 MG/DL
LABORATORY COMMENT REPORT: ABNORMAL
NONHDLC SERPL-MCNC: 315 MG/DL (ref 0–129)

## 2025-01-03 NOTE — TELEPHONE ENCOUNTER
----- Message from Finn Escalante sent at 1/3/2025 12:20 PM EST -----  Please call the patient regarding his abnormal result.  Is he taking his medications.  If yes increase his Crestor to 40 mg daily.  Add Zetia 10 mg daily.  And we need to start him on PCSK9's.

## 2025-01-04 LAB
ALBUMIN SERPL-MCNC: 5 G/DL (ref 4.1–5.1)
ALP SERPL-CCNC: 59 IU/L (ref 44–121)
ALT SERPL-CCNC: 56 IU/L (ref 0–44)
AST SERPL-CCNC: 41 IU/L (ref 0–40)
BILIRUB DIRECT SERPL-MCNC: 0.05 MG/DL (ref 0–0.4)
BILIRUB SERPL-MCNC: <0.2 MG/DL (ref 0–1.2)
PROT SERPL-MCNC: 8 G/DL (ref 6–8.5)

## 2025-01-24 DIAGNOSIS — E78.2 MIXED HYPERLIPIDEMIA: ICD-10-CM

## 2025-01-24 RX ORDER — FENOFIBRATE 145 MG/1
145 TABLET, COATED ORAL DAILY
Qty: 90 TABLET | Refills: 0 | Status: SHIPPED | OUTPATIENT
Start: 2025-01-24

## 2025-04-29 ENCOUNTER — OFFICE VISIT (OUTPATIENT)
Dept: FAMILY MEDICINE CLINIC | Facility: CLINIC | Age: 50
End: 2025-04-29
Payer: MEDICAID

## 2025-04-29 VITALS
HEIGHT: 74 IN | SYSTOLIC BLOOD PRESSURE: 136 MMHG | TEMPERATURE: 98.6 F | OXYGEN SATURATION: 98 % | WEIGHT: 222.6 LBS | HEART RATE: 98 BPM | DIASTOLIC BLOOD PRESSURE: 90 MMHG | BODY MASS INDEX: 28.57 KG/M2

## 2025-04-29 DIAGNOSIS — R31.9 HEMATURIA, UNSPECIFIED TYPE: ICD-10-CM

## 2025-04-29 DIAGNOSIS — R35.0 URINE FREQUENCY: Primary | ICD-10-CM

## 2025-04-29 DIAGNOSIS — R10.9 FLANK PAIN: ICD-10-CM

## 2025-04-29 LAB
BILIRUB BLD-MCNC: NEGATIVE MG/DL
CLARITY, POC: ABNORMAL
COLOR UR: YELLOW
EXPIRATION DATE: ABNORMAL
GLUCOSE UR STRIP-MCNC: NEGATIVE MG/DL
KETONES UR QL: NEGATIVE
LEUKOCYTE EST, POC: NEGATIVE
Lab: ABNORMAL
NITRITE UR-MCNC: NEGATIVE MG/ML
PH UR: 6 [PH] (ref 5–8)
PROT UR STRIP-MCNC: NEGATIVE MG/DL
RBC # UR STRIP: ABNORMAL /UL
SP GR UR: 1.03 (ref 1–1.03)
UROBILINOGEN UR QL: ABNORMAL

## 2025-04-29 PROCEDURE — 87086 URINE CULTURE/COLONY COUNT: CPT | Performed by: FAMILY MEDICINE

## 2025-04-29 RX ORDER — POTASSIUM CITRATE 1080 MG/1
10 TABLET, EXTENDED RELEASE ORAL
Qty: 42 TABLET | Refills: 0 | Status: SHIPPED | OUTPATIENT
Start: 2025-04-29 | End: 2025-05-13

## 2025-04-29 RX ORDER — TAMSULOSIN HYDROCHLORIDE 0.4 MG/1
1 CAPSULE ORAL DAILY
Qty: 14 CAPSULE | Refills: 0 | Status: SHIPPED | OUTPATIENT
Start: 2025-04-29

## 2025-04-29 NOTE — PROGRESS NOTES
Follow Up Office Visit      Patient Name: Frankie Alatorre Ra  : 1975   MRN: [id number]     Chief Complaint:    Chief Complaint   Patient presents with    kidney stones     Pt states that he passed his kidney stones but he is still in pain in his lower pelvis and his lower back, he has urine frequency and the stream is poor.   The stones were passed about 4 months ago. He's states that he felt better for a little while and then these symptoms started back       History of Present Illness: Frankie Alatorre Ra is a 49 y.o. male who is here today to follow up with back pain, flank pain, increased urinary frequency. These things have started for the past few months.     He has a dysuria. Had it today at first urination. In the past week, he says it only happened once. He peed five times today. He says that is more than usual. First time he ever had kidney stones was four months ago.    So apparently the patient has been doing well until today.  Patient now has acute onset left flank pain that is bilateral and radiates to his groin region.  Patient is having dysuria and urinary frequency      Physical exam: Bilateral Anish's punch was positive with radiation to his groin.      Subjective        I have reviewed and the following portions of the patient's history were updated as appropriate: past family history, past medical history, past social history, past surgical history and problem list.    Medications:     Current Outpatient Medications:     acetaminophen (TYLENOL) 500 MG tablet, Take 1 tablet by mouth Every 6 (Six) Hours As Needed for Mild Pain., Disp: , Rfl:     fenofibrate (TRICOR) 145 MG tablet, TAKE 1 TABLET BY MOUTH EVERY DAY, Disp: 90 tablet, Rfl: 0    potassium citrate (UROCIT-K) 10 MEQ (1080 MG) CR tablet, Take 1 tablet by mouth 3 (Three) Times a Day With Meals for 14 days., Disp: 42 tablet, Rfl: 0    tamsulosin (FLOMAX) 0.4 MG capsule 24 hr capsule, Take 1 capsule by mouth  "Daily., Disp: 14 capsule, Rfl: 0    Allergies:   No Known Allergies    Objective     Physical Exam: Please see above  Vital Signs:   Vitals:    04/29/25 1636   BP: 136/90   Pulse: 98   Temp: 98.6 °F (37 °C)   TempSrc: Infrared   SpO2: 98%   Weight: 101 kg (222 lb 9.6 oz)   Height: 188 cm (74.02\")   PainSc: 6      Body mass index is 28.57 kg/m².          Assessment / Plan      Assessment/Plan:   Diagnoses and all orders for this visit:    1. Urine frequency (Primary)  -     POC Urinalysis Dipstick, Automated  -     Cancel: US Renal Bilateral; Future  -     tamsulosin (FLOMAX) 0.4 MG capsule 24 hr capsule; Take 1 capsule by mouth Daily.  Dispense: 14 capsule; Refill: 0  -     potassium citrate (UROCIT-K) 10 MEQ (1080 MG) CR tablet; Take 1 tablet by mouth 3 (Three) Times a Day With Meals for 14 days.  Dispense: 42 tablet; Refill: 0  -     US Renal Bilateral; Future    2. Flank pain  -     Cancel: US Renal Bilateral; Future  -     US Renal Bilateral; Future    3. Hematuria, unspecified type  -     Urine Culture - Urine, Urine, Clean Catch; Future    Patient has hematuria along with a positive Anish punch bilaterally.  The costovertebral tenderness on exam radiated to his groin.  Recommend ultrasound stat to look for kidney stones.  Consider CT if ultrasound is negative.    Start treatment for what seems to be kidney stones including Flomax and potassium citrate.  See the patient back in 2 weeks for reevaluation.  Consider further testing if needed    Etiology is uncertain.  Will need further testing and the patient was treated today using short-term medications.  Will need to rule out kidney stones and infection with testing today.  Will see patient back in 2 weeks to see how he is doing and to stop medications if needed.    Follow Up:   Return for follow up with pcp .    Geovani Smallwood DO  [facility] [place]   "

## 2025-04-30 ENCOUNTER — HOSPITAL ENCOUNTER (OUTPATIENT)
Dept: ULTRASOUND IMAGING | Facility: HOSPITAL | Age: 50
Discharge: HOME | End: 2025-04-30
Admitting: FAMILY MEDICINE
Payer: MEDICAID

## 2025-04-30 ENCOUNTER — RESULTS FOLLOW-UP (OUTPATIENT)
Dept: FAMILY MEDICINE CLINIC | Facility: CLINIC | Age: 50
End: 2025-04-30
Payer: MEDICAID

## 2025-04-30 DIAGNOSIS — R31.9 HEMATURIA, UNSPECIFIED TYPE: ICD-10-CM

## 2025-04-30 DIAGNOSIS — R35.0 URINE FREQUENCY: ICD-10-CM

## 2025-04-30 DIAGNOSIS — R10.9 FLANK PAIN: ICD-10-CM

## 2025-04-30 DIAGNOSIS — R35.0 URINE FREQUENCY: Primary | ICD-10-CM

## 2025-04-30 LAB — BACTERIA SPEC AEROBE CULT: NO GROWTH

## 2025-04-30 PROCEDURE — 76775 US EXAM ABDO BACK WALL LIM: CPT

## 2025-05-02 ENCOUNTER — LAB (OUTPATIENT)
Dept: LAB | Facility: HOSPITAL | Age: 50
End: 2025-05-02
Payer: MEDICAID

## 2025-05-02 DIAGNOSIS — R35.0 URINE FREQUENCY: ICD-10-CM

## 2025-05-02 DIAGNOSIS — R31.9 HEMATURIA, UNSPECIFIED TYPE: ICD-10-CM

## 2025-05-02 LAB
BACTERIA UR QL AUTO: ABNORMAL /HPF
BILIRUB UR QL STRIP: NEGATIVE
CLARITY UR: CLEAR
COLOR UR: YELLOW
GLUCOSE UR STRIP-MCNC: NEGATIVE MG/DL
HGB UR QL STRIP.AUTO: ABNORMAL
HYALINE CASTS UR QL AUTO: ABNORMAL /LPF
KETONES UR QL STRIP: ABNORMAL
LEUKOCYTE ESTERASE UR QL STRIP.AUTO: NEGATIVE
NITRITE UR QL STRIP: NEGATIVE
PH UR STRIP.AUTO: 6 [PH] (ref 5–8)
PROT UR QL STRIP: NEGATIVE
RBC # UR STRIP: ABNORMAL /HPF
REF LAB TEST METHOD: ABNORMAL
SP GR UR STRIP: 1.03 (ref 1–1.03)
SQUAMOUS #/AREA URNS HPF: ABNORMAL /HPF
UROBILINOGEN UR QL STRIP: ABNORMAL
WBC # UR STRIP: ABNORMAL /HPF

## 2025-05-02 PROCEDURE — 81001 URINALYSIS AUTO W/SCOPE: CPT

## 2025-05-06 ENCOUNTER — RESULTS FOLLOW-UP (OUTPATIENT)
Dept: LAB | Facility: HOSPITAL | Age: 50
End: 2025-05-06
Payer: MEDICAID

## 2025-05-14 ENCOUNTER — LAB (OUTPATIENT)
Dept: LAB | Facility: HOSPITAL | Age: 50
End: 2025-05-14
Payer: MEDICAID

## 2025-05-14 ENCOUNTER — OFFICE VISIT (OUTPATIENT)
Dept: FAMILY MEDICINE CLINIC | Facility: CLINIC | Age: 50
End: 2025-05-14
Payer: MEDICAID

## 2025-05-14 VITALS
TEMPERATURE: 98.7 F | HEIGHT: 74 IN | WEIGHT: 222 LBS | HEART RATE: 109 BPM | DIASTOLIC BLOOD PRESSURE: 80 MMHG | BODY MASS INDEX: 28.49 KG/M2 | SYSTOLIC BLOOD PRESSURE: 146 MMHG

## 2025-05-14 DIAGNOSIS — Z12.11 COLON CANCER SCREENING: ICD-10-CM

## 2025-05-14 DIAGNOSIS — R31.1 BENIGN ESSENTIAL MICROSCOPIC HEMATURIA: ICD-10-CM

## 2025-05-14 DIAGNOSIS — E78.41 ELEVATED LIPOPROTEIN(A): ICD-10-CM

## 2025-05-14 DIAGNOSIS — Z00.00 ANNUAL PHYSICAL EXAM: Primary | ICD-10-CM

## 2025-05-14 DIAGNOSIS — R35.0 URINE FREQUENCY: ICD-10-CM

## 2025-05-14 DIAGNOSIS — R10.2 SUPRAPUBIC PAIN: ICD-10-CM

## 2025-05-14 LAB
BACTERIA UR QL AUTO: ABNORMAL /HPF
BILIRUB UR QL STRIP: NEGATIVE
CLARITY UR: ABNORMAL
COLOR UR: ABNORMAL
GLUCOSE UR STRIP-MCNC: NEGATIVE MG/DL
HGB UR QL STRIP.AUTO: ABNORMAL
HOLD SPECIMEN: NORMAL
HYALINE CASTS UR QL AUTO: ABNORMAL /LPF
KETONES UR QL STRIP: ABNORMAL
LEUKOCYTE ESTERASE UR QL STRIP.AUTO: NEGATIVE
NITRITE UR QL STRIP: NEGATIVE
PH UR STRIP.AUTO: 5.5 [PH] (ref 5–8)
PROT UR QL STRIP: ABNORMAL
RBC # UR STRIP: ABNORMAL /HPF
REF LAB TEST METHOD: ABNORMAL
SP GR UR STRIP: >1.03 (ref 1–1.03)
SQUAMOUS #/AREA URNS HPF: ABNORMAL /HPF
UROBILINOGEN UR QL STRIP: ABNORMAL
WBC # UR STRIP: ABNORMAL /HPF

## 2025-05-14 PROCEDURE — 80061 LIPID PANEL: CPT

## 2025-05-14 PROCEDURE — 81001 URINALYSIS AUTO W/SCOPE: CPT

## 2025-05-14 PROCEDURE — 83721 ASSAY OF BLOOD LIPOPROTEIN: CPT

## 2025-05-14 PROCEDURE — 80053 COMPREHEN METABOLIC PANEL: CPT

## 2025-05-14 RX ORDER — ATORVASTATIN CALCIUM 40 MG/1
40 TABLET, FILM COATED ORAL DAILY
Qty: 90 TABLET | Refills: 0 | Status: SHIPPED | OUTPATIENT
Start: 2025-05-14

## 2025-05-14 RX ORDER — TAMSULOSIN HYDROCHLORIDE 0.4 MG/1
1 CAPSULE ORAL DAILY
Qty: 90 CAPSULE | Refills: 0 | Status: SHIPPED | OUTPATIENT
Start: 2025-05-14

## 2025-05-14 NOTE — PROGRESS NOTES
Follow Up Office Visit      Patient Name: Frankie Alatorre Ra  : 1975   MRN: [id number]     Chief Complaint:    Chief Complaint   Patient presents with    Abdominal Pain     F/U, no pain today    Annual Exam       History of Present Illness: Frankie Alatorre Ra is a 49 y.o. male who is here today to follow up with abdominal pain that is in the suprapubic region.  Patient says that Flomax did seem to help.  Over the last 6 months he has had various symptoms.  He says the pain has been fairly constant since his ER visit back in November/December of last year.  Patient says that he has pain in his bladder when he presses on his lower abdomen.  It feels like a open wound.  He says it feels like on the inside.  Patient says that last visit he was peeing a lot he had a lot of frequency.  But he says that the medication we gave him helped.  Patient says that he does get up at night to urinate.  Patient does not have a lot of issues with initiating stream.  He did have a lot of frequency before.    The patient would also like to discuss annual exam today.  Patient's annual exam we reviewed his lab work and saw the hyperlipidemia.  Reviewed ASCVD risk with patient.  Also discussed risk of pancreatitis.  We reviewed recommendations for colonoscopy and vaccines.  Also reviewed dental care, diet and exercise recommendations.  Patient reports being up-to-date with dental care.  He reports low exercise routine, so we discussed that and recommended that he increase his exercise amount on a daily basis.  Also recommend that he eat a healthy diet which he reports having.    Discussed his hyperlipidemia and the high triglycerides.  This is a major issue and can cause pancreatitis.  Were in a start a statin medication to the lower triglycerides and we also advised him to eat a better diet.  I will going to check lipids again in 3 months    Colonoscopy was discussed with patient.  Ordered Cologuard instead.   "Patient okay with Cologuard testing.  PSA testing already done and is good.      Physical exam: Patient's heart exam was RRR.  No murmurs.  Patient's lung exam was CTA bilateral.  No lymphadenopathy in the cervical region.  Thyroid midline normal.  Bilateral ear exam shows pink canal.  Mood and affect appropriate.        Of note, we used an  for this visit today.  Patient speaks Wolof and so  was used the entire time.      Subjective        I have reviewed and the following portions of the patient's history were updated as appropriate: past family history, past medical history, past social history, past surgical history and problem list.    Medications:     Current Outpatient Medications:     acetaminophen (TYLENOL) 500 MG tablet, Take 1 tablet by mouth Every 6 (Six) Hours As Needed for Mild Pain., Disp: , Rfl:     atorvastatin (Lipitor) 40 MG tablet, Take 1 tablet by mouth Daily., Disp: 90 tablet, Rfl: 0    tamsulosin (FLOMAX) 0.4 MG capsule 24 hr capsule, Take 1 capsule by mouth Daily., Disp: 90 capsule, Rfl: 0    Allergies:   No Known Allergies    Objective     Physical Exam: Please see above  Vital Signs:   Vitals:    05/14/25 1551   BP: 146/80   Pulse: 109   Temp: 98.7 °F (37.1 °C)   TempSrc: Infrared   Weight: 101 kg (222 lb)   Height: 188 cm (74.02\")   PainSc: 0-No pain     Body mass index is 28.49 kg/m².          Assessment / Plan      Assessment/Plan:   Diagnoses and all orders for this visit:    1. Annual physical exam (Primary)    2. Urine frequency  -     tamsulosin (FLOMAX) 0.4 MG capsule 24 hr capsule; Take 1 capsule by mouth Daily.  Dispense: 90 capsule; Refill: 0    3. Suprapubic pain    4. Benign essential microscopic hematuria  -     Urinalysis With Culture If Indicated - Urine, Clean Catch; Future    5. Elevated lipoprotein(a)  -     Lipid Panel; Future  -     Comprehensive Metabolic Panel; Future  -     atorvastatin (Lipitor) 40 MG tablet; Take 1 tablet by mouth Daily.  " Dispense: 90 tablet; Refill: 0    6. Colon cancer screening  -     Cologuard - Stool, Per Rectum; Future     used for this visit.    Annual exam counseling: Counseled patient regards to diet, exercise, dental care vaccines and colon cancer screening.  Also reviewed prostate cancer screening which look good for the patient.  Ordered Cologuard for the patient.  Patient updated vaccines.  Patient up-to-date with dental care.  Encouraged patient to exercise more often in the form of strength training.  Encouraged him to eat a healthy diet and he reports a pretty healthy diet.    Hypertriglyceridemia-start atorvastatin.  Will likely need to increase to 80 mg next visit.  Recheck lipids next visit    Patient's symptoms as far as the nocturia, urinary frequency, and bladder pressure or pain-all this sounds like possible BPH symptoms.  He had some improvement on tamsulosin so going to do a 3-month trial of it to see if his symptoms continue to improve.  He had some blood in his urine sort and recheck for the blood again.  We may have to send patient back for another endoscopy at urology.  At this time we will see patient back first and then go from there          Follow Up:   Return in about 3 months (around 8/14/2025) for Labs today, hld, bph.    Geovani Smallwood, DO  [facility] [place]

## 2025-05-15 ENCOUNTER — RESULTS FOLLOW-UP (OUTPATIENT)
Dept: LAB | Facility: HOSPITAL | Age: 50
End: 2025-05-15
Payer: MEDICAID

## 2025-05-15 LAB
ALBUMIN SERPL-MCNC: 4.7 G/DL (ref 3.5–5.2)
ALBUMIN/GLOB SERPL: 1.8 G/DL
ALP SERPL-CCNC: 59 U/L (ref 39–117)
ALT SERPL W P-5'-P-CCNC: 57 U/L (ref 1–41)
ANION GAP SERPL CALCULATED.3IONS-SCNC: 13.1 MMOL/L (ref 5–15)
ARTICHOKE IGE QN: 70 MG/DL (ref 0–100)
AST SERPL-CCNC: 40 U/L (ref 1–40)
BILIRUB SERPL-MCNC: 0.4 MG/DL (ref 0–1.2)
BUN SERPL-MCNC: 17 MG/DL (ref 6–20)
BUN/CREAT SERPL: 15.6 (ref 7–25)
CALCIUM SPEC-SCNC: 9.2 MG/DL (ref 8.6–10.5)
CHLORIDE SERPL-SCNC: 102 MMOL/L (ref 98–107)
CHOLEST SERPL-MCNC: 258 MG/DL (ref 0–200)
CO2 SERPL-SCNC: 23.9 MMOL/L (ref 22–29)
CREAT SERPL-MCNC: 1.09 MG/DL (ref 0.76–1.27)
EGFRCR SERPLBLD CKD-EPI 2021: 83.7 ML/MIN/1.73
GLOBULIN UR ELPH-MCNC: 2.6 GM/DL
GLUCOSE SERPL-MCNC: 102 MG/DL (ref 65–99)
HDLC SERPL-MCNC: 26 MG/DL (ref 40–60)
LDLC SERPL CALC-MCNC: ABNORMAL MG/DL
LDLC/HDLC SERPL: ABNORMAL {RATIO}
POTASSIUM SERPL-SCNC: 3.9 MMOL/L (ref 3.5–5.2)
PROT SERPL-MCNC: 7.3 G/DL (ref 6–8.5)
SODIUM SERPL-SCNC: 139 MMOL/L (ref 136–145)
TRIGL SERPL-MCNC: 952 MG/DL (ref 0–150)
VLDLC SERPL-MCNC: ABNORMAL MG/DL

## 2025-05-23 ENCOUNTER — TELEPHONE (OUTPATIENT)
Dept: UROLOGY | Facility: CLINIC | Age: 50
End: 2025-05-23

## 2025-05-23 NOTE — TELEPHONE ENCOUNTER
This patient requires an  for their appointment. Please see the  information below.     Caller: MARY FORDE   Relationship to patient: SPOUSE  Best call back number: [phone]    Service: [facility]RIK   Is  needs updated in registration: yes  Date of Appointment:5-30-25  Time of Appointment:11:30AM  Additional notes:

## 2025-05-30 ENCOUNTER — OFFICE VISIT (OUTPATIENT)
Dept: UROLOGY | Facility: CLINIC | Age: 50
End: 2025-05-30
Payer: MEDICAID

## 2025-05-30 VITALS — WEIGHT: 222 LBS | BODY MASS INDEX: 28.49 KG/M2 | HEIGHT: 74 IN

## 2025-05-30 DIAGNOSIS — N20.0 KIDNEY STONE: Primary | ICD-10-CM

## 2025-05-30 DIAGNOSIS — R31.29 MICROHEMATURIA: ICD-10-CM

## 2025-05-30 LAB
BILIRUB BLD-MCNC: NEGATIVE MG/DL
CLARITY, POC: CLEAR
COLOR UR: YELLOW
EXPIRATION DATE: ABNORMAL
GLUCOSE UR STRIP-MCNC: NEGATIVE MG/DL
KETONES UR QL: NEGATIVE
LEUKOCYTE EST, POC: NEGATIVE
Lab: ABNORMAL
NITRITE UR-MCNC: NEGATIVE MG/ML
PH UR: 6 [PH] (ref 5–8)
PROT UR STRIP-MCNC: NEGATIVE MG/DL
RBC # UR STRIP: ABNORMAL /UL
SP GR UR: 1.03 (ref 1–1.03)
UROBILINOGEN UR QL: ABNORMAL

## 2025-05-30 NOTE — PROGRESS NOTES
Follow Up Office Visit      Patient Name: Frankie Alatorre Ra  : 1975   MRN: [id number]     Chief Complaint:    Chief Complaint   Patient presents with    Flank Pain       Referring Provider: No ref. provider found    Slovak : [id number]    History of Present Illness: Frankie Alatorre Ra is a 49 y.o. male who presents today for follow up of nephrolithiasis.  He reports he has done well.  He reports he has been having more pain.  He underwent renal sono recently that did not have any urologic.    His pain is not urologic in origin.  However, he has microhematuria    Subjective      Review of System:   Review of Systems   I have reviewed the ROS documented by my clinical staff, I have updated appropriately and I agree. Colt Stanton MD    I have reviewed and the following portions of the patient's history were updated as appropriate: past family history, past medical history, past social history, past surgical history and problem list.    Past Medical History:   Past Medical History:   Diagnosis Date    Abnormal ECG     Snoring     Ventricular tachycardia (paroxysmal)        Past Surgical History:  Past Surgical History:   Procedure Laterality Date    BICEPS TENDON REPAIR Left 2022    Procedure: BICEPS TENDON REPAIR LEFT;  Surgeon: Noah Herron MD;  Location: [facility];  Service: Orthopedics;  Laterality: Left;       Family History:   family history includes Asthma in his father.   Otherwise pertinent FHx was reviewed and not pertinent to current issue.    Social History:    reports that he has never smoked. He has never been exposed to tobacco smoke. He has never used smokeless tobacco. He reports that he does not drink alcohol and does not use drugs.    Medications:     Current Outpatient Medications:     acetaminophen (TYLENOL) 500 MG tablet, Take 1 tablet by mouth Every 6 (Six) Hours As Needed for Mild Pain., Disp: , Rfl:     atorvastatin (Lipitor) 40 MG  "tablet, Take 1 tablet by mouth Daily., Disp: 90 tablet, Rfl: 0    tamsulosin (FLOMAX) 0.4 MG capsule 24 hr capsule, Take 1 capsule by mouth Daily., Disp: 90 capsule, Rfl: 0    Allergies:   No Known Allergies    IPSS Questionnaire (AUA-7):  Over the past month…    1)  Incomplete Emptying  How often have you had a sensation of not emptying your bladder?  1 - Less than 1 time in 5   2)  Frequency  How often have you had to urinate less than every two hours? 1 - Less than 1 time in 5   3)  Intermittency  How often have you found you stopped and started again several times when you urinated?  1 - Less than 1 time in 5   4) Urgency  How often have you found it difficult to postpone urination?  2 - Less than half the time   5) Weak Stream  How often have you had a weak urinary stream?  2 - Less than half the time   6) Straining  How often have you had to push or strain to begin urination?  1 - Less than 1 time in 5   7) Nocturia  How many times did you typically get up at night to urinate?  1 - 1 time   Total Score:  9   The International Prostate Symptom Score (IPSS) is used to screen, diagnose, track symptoms of benign prostatic hyperplasia (BPH).    0-7 pts (Mild Symptoms)  / 8-19 pts (Moderate) / 20-35 (Severe)    Quality of life due to urinary symptoms:  If you were to spend the rest of your life with your urinary condition the way it is now, how would you feel about that? 3-Mixed   Urine Leakage (Incontinence) 0-No Leakage       Objective     Physical Exam:   Vital Signs:   Vitals:    05/30/25 1113   Weight: 101 kg (222 lb)   Height: 188 cm (74.02\")   PainSc: 0-No pain     Body mass index is 28.49 kg/m².     Physical Exam  Vitals and nursing note reviewed.   Constitutional:       General: He is awake. He is not in acute distress.     Appearance: Normal appearance. He is well-developed.   HENT:      Head: Normocephalic and atraumatic.      Right Ear: External ear normal.      Left Ear: External ear normal.      Nose: " Nose normal.   Eyes:      Conjunctiva/sclera: Conjunctivae normal.   Pulmonary:      Effort: Pulmonary effort is normal.   Abdominal:      General: There is no distension.      Palpations: Abdomen is soft. There is no mass.      Tenderness: There is no abdominal tenderness. There is no right CVA tenderness, left CVA tenderness, guarding or rebound.      Hernia: No hernia is present. There is no hernia in the left inguinal area or right inguinal area.   Genitourinary:     Pubic Area: No rash.       Rectum: No mass or tenderness. Normal anal tone.   Musculoskeletal:      Cervical back: Normal range of motion.   Lymphadenopathy:      Lower Body: No right inguinal adenopathy. No left inguinal adenopathy.   Skin:     General: Skin is warm.   Neurological:      General: No focal deficit present.      Mental Status: He is alert and oriented to person, place, and time.   Psychiatric:         Behavior: Behavior normal. Behavior is cooperative.         Labs:   Brief Urine Lab Results  (Last result in the past 365 days)        Color   Clarity   Blood   Leuk Est   Nitrite   Protein   CREAT   Urine HCG        05/14/25 1644 Dark Yellow   Turbid   Small (1+)   Negative   Negative   Trace                   Urine Culture          10/30/2024    17:16 4/29/2025    17:09   Urine Culture   Urine Culture No growth  No growth         Lab Results   Component Value Date    GLUCOSE 102 (H) 05/14/2025    CALCIUM 9.2 05/14/2025     05/14/2025    K 3.9 05/14/2025    CO2 23.9 05/14/2025     05/14/2025    BUN 17 05/14/2025    CREATININE 1.09 05/14/2025    EGFRIFAFRI >150 06/11/2021    EGFRIFNONA >150 06/11/2021    BCR 15.6 05/14/2025    ANIONGAP 13.1 05/14/2025       Lab Results   Component Value Date    WBC 10.69 09/17/2024    HGB 14.2 09/17/2024    HCT 39.0 09/17/2024    MCV 87.8 09/17/2024     09/17/2024       Lab Results   Component Value Date    PSA 1.230 01/02/2025    PSA 1.550 06/11/2021       Images:   US Renal  Bilateral  Result Date: 4/30/2025  Impression: Bilateral renal ultrasound and ultrasound examination of the urinary bladder demonstrating no abnormality. Electronically Signed: Adam Tolbert MD  4/30/2025 4:30 PM EDT  Workstation ID: [id number]      Measures:   Tobacco:   Frankie Alatorre Ra  reports that he has never smoked. He has never been exposed to tobacco smoke. He has never used smokeless tobacco.       Urine Incontinence: Patient reports that he is not currently experiencing any symptoms of urinary incontinence.         Assessment / Plan      Assessment/Plan:   49 y.o. male who presented today for follow up of nephrolithiasis and back pain.  His pain is unlikely to be urologic in origin.  However, he has microhematuria and a history of smoking.  I will obtain CT urogram and have him follow up for cystoscopy.      Diagnoses and all orders for this visit:    1. Kidney stone (Primary)  -     CT Abdomen Pelvis With & Without Contrast; Future    2. Microhematuria         Follow Up:   Return in about 3 weeks (around 6/20/2025).    I spent approximately 40 minutes providing clinical care for this patient; including review of patient's chart and provider documentation, face to face time spent with patient in examination room (obtaining history, performing physical exam, discussing diagnosis and management options), placing orders, and completing patient documentation.     Colt Stanton MD  [facility] [place]

## 2025-06-20 ENCOUNTER — HOSPITAL ENCOUNTER (OUTPATIENT)
Dept: CT IMAGING | Facility: HOSPITAL | Age: 50
Discharge: HOME | End: 2025-06-20
Payer: MEDICAID

## 2025-06-20 DIAGNOSIS — N20.0 KIDNEY STONE: ICD-10-CM

## 2025-06-20 PROCEDURE — 25510000001 IOPAMIDOL PER 1 ML: Performed by: UROLOGY

## 2025-06-20 PROCEDURE — 74178 CT ABD&PLV WO CNTR FLWD CNTR: CPT

## 2025-06-20 RX ORDER — IOPAMIDOL 755 MG/ML
150 INJECTION, SOLUTION INTRAVASCULAR
Status: COMPLETED | OUTPATIENT
Start: 2025-06-20 | End: 2025-06-20

## 2025-06-20 RX ADMIN — IOPAMIDOL 150 ML: 755 INJECTION, SOLUTION INTRAVENOUS at 15:50

## 2025-07-30 ENCOUNTER — OFFICE VISIT (OUTPATIENT)
Dept: FAMILY MEDICINE CLINIC | Facility: CLINIC | Age: 50
End: 2025-07-30
Payer: MEDICAID

## 2025-07-30 VITALS
HEART RATE: 86 BPM | OXYGEN SATURATION: 96 % | SYSTOLIC BLOOD PRESSURE: 118 MMHG | DIASTOLIC BLOOD PRESSURE: 60 MMHG | TEMPERATURE: 98.6 F | RESPIRATION RATE: 19 BRPM | WEIGHT: 222 LBS | BODY MASS INDEX: 28.49 KG/M2 | HEIGHT: 74 IN

## 2025-07-30 DIAGNOSIS — S16.1XXA NECK STRAIN, INITIAL ENCOUNTER: Primary | ICD-10-CM

## 2025-07-30 RX ORDER — METHOCARBAMOL 500 MG/1
500 TABLET, FILM COATED ORAL 3 TIMES DAILY PRN
Qty: 30 TABLET | Refills: 0 | Status: SHIPPED | OUTPATIENT
Start: 2025-07-30

## 2025-07-30 NOTE — PROGRESS NOTES
Follow Up Office Visit      Date: 2025   Patient Name: Frankie Alatorre Ra  : 1975   MRN: [id number]     Chief Complaint:    Chief Complaint   Patient presents with    Shoulder Pain     Left shoulder pain x 1 week. Pt denies any known injury but states pain started at work. Pt sometimes lifts between 40 - 50 lb at work.        History of Present Illness: Frankie Alatorre Ra is a 50 y.o. male who is here today for shoulder pain. Onset 1 week ago. Pain began gradually. States the night prior to pain beginning his 3 year old child slept on his left arm during the night. Pt denies any injury or accident prior to onset of pain. Pain localized to left side of neck and left shoulder. Pain is waxing and waning. Characterized as muscle cramp. Alleviated by vicks vaporub, ibuprofen. Aggravated by lefting L arm above his head and movements of the head/neck. Pain does not radiate. Pain severity rated 7/10 currently. Pt denies any prior imaging of shoulder/neck.     .    Subjective      Review of Systems:   Review of Systems   Respiratory:  Negative for shortness of breath.    Cardiovascular:  Negative for chest pain, palpitations and leg swelling.       I have reviewed the patients family history, social history, past medical history, past surgical history and have updated it as appropriate.     Medications:     Current Outpatient Medications:     acetaminophen (TYLENOL) 500 MG tablet, Take 1 tablet by mouth Every 6 (Six) Hours As Needed for Mild Pain., Disp: , Rfl:     atorvastatin (Lipitor) 40 MG tablet, Take 1 tablet by mouth Daily., Disp: 90 tablet, Rfl: 0    tamsulosin (FLOMAX) 0.4 MG capsule 24 hr capsule, Take 1 capsule by mouth Daily., Disp: 90 capsule, Rfl: 0    methocarbamol (ROBAXIN) 500 MG tablet, Take 1 tablet by mouth 3 (Three) Times a Day As Needed for Muscle Spasms., Disp: 30 tablet, Rfl: 0    Allergies:   No Known Allergies    Objective     Physical Exam: Please see above  Vital  "Signs:   Vitals:    07/30/25 1318   BP: 118/60   Pulse: 86   Resp: 19   Temp: 98.6 °F (37 °C)   TempSrc: Temporal   SpO2: 96%   Weight: 101 kg (222 lb)   Height: 188 cm (74.02\")     Body mass index is 28.49 kg/m².          Physical Exam  Vitals and nursing note reviewed.   Constitutional:       General: He is not in acute distress.     Appearance: He is not ill-appearing, toxic-appearing or diaphoretic.   Neck:     Cardiovascular:      Rate and Rhythm: Normal rate and regular rhythm.      Pulses:           Radial pulses are 2+ on the right side and 2+ on the left side.   Pulmonary:      Effort: Pulmonary effort is normal. No respiratory distress.      Breath sounds: Normal breath sounds.   Musculoskeletal:      Right shoulder: Normal range of motion.      Left shoulder: Normal range of motion.      Cervical back: Pain with movement and muscular tenderness (see illustration) present. Decreased range of motion (right lateral flexion, extension, and rotation b/l).   Skin:     General: Skin is warm and dry.   Neurological:      General: No focal deficit present.      Mental Status: He is alert and oriented to person, place, and time.      Sensory: Sensation is intact.      Comments: Sensation to light touch GWNL along radial, ulnar, median, and axillary nerve distributions bilaterally.   Psychiatric:         Mood and Affect: Mood normal.         Behavior: Behavior normal.           Results:   Labs:   Hemoglobin A1C   Date Value Ref Range Status   07/07/2023 5.40 4.80 - 5.60 % Final     TSH   Date Value Ref Range Status   02/07/2024 1.470 0.270 - 4.200 uIU/mL Final        POCT Results (if applicable):   Results for orders placed or performed in visit on 06/14/25   Cologuard - Stool, Per Rectum    Collection Time: 06/15/25  3:09 PM    Specimen: Per Rectum; Stool   Result Value Ref Range    Cologuard Negative Negative       Assessment / Plan      Assessment/Plan:   1. Neck strain, initial encounter  - Vital signs stable.  " Patient experiencing pain along the left side of neck and left upper back starting after child slept on his left upper extremity during the night.  - Supportive treatment options outlined including resuming daily activity as tolerated, light stretching/massage, alternating ice/heat, and use of over-the-counter Tylenol as needed pain/discomfort.  - Muscle relaxer prescribed to be used as needed.  Risks, benefits, and appropriate use of medication reviewed. Specifically, discussed risk of sedation when taking muscle relaxer. Advised patient to avoid any driving while taking medication.  - Patient advised to follow-up if pain does not improve or worsen.  - Patient verbalized understanding and agreement with treatment plan.  All questions answered.  - methocarbamol (ROBAXIN) 500 MG tablet; Take 1 tablet by mouth 3 (Three) Times a Day As Needed for Muscle Spasms.  Dispense: 30 tablet; Refill: 0       Follow Up:   Return if symptoms worsen or fail to improve.    **Macedonian-English  used throughout patient encounter**    ALLAN Otero

## 2025-08-15 ENCOUNTER — OFFICE VISIT (OUTPATIENT)
Dept: FAMILY MEDICINE CLINIC | Facility: CLINIC | Age: 50
End: 2025-08-15
Payer: MEDICAID

## 2025-08-15 VITALS
SYSTOLIC BLOOD PRESSURE: 112 MMHG | BODY MASS INDEX: 28.11 KG/M2 | DIASTOLIC BLOOD PRESSURE: 70 MMHG | TEMPERATURE: 98.2 F | HEART RATE: 70 BPM | HEIGHT: 74 IN | OXYGEN SATURATION: 96 % | WEIGHT: 219 LBS

## 2025-08-15 DIAGNOSIS — Z78.9 PATIENT SPEAKS ONLY A FOREIGN LANGUAGE: ICD-10-CM

## 2025-08-15 DIAGNOSIS — E78.41 ELEVATED LIPOPROTEIN(A): ICD-10-CM

## 2025-08-15 DIAGNOSIS — E78.1 HIGH TRIGLYCERIDES: Primary | ICD-10-CM

## 2025-08-15 DIAGNOSIS — R35.0 URINE FREQUENCY: ICD-10-CM

## 2025-08-15 PROCEDURE — 1126F AMNT PAIN NOTED NONE PRSNT: CPT | Performed by: FAMILY MEDICINE

## 2025-08-15 PROCEDURE — 99214 OFFICE O/P EST MOD 30 MIN: CPT | Performed by: FAMILY MEDICINE

## 2025-08-15 RX ORDER — ATORVASTATIN CALCIUM 40 MG/1
40 TABLET, FILM COATED ORAL DAILY
Qty: 90 TABLET | Refills: 3 | Status: SHIPPED | OUTPATIENT
Start: 2025-08-15